# Patient Record
Sex: MALE | Race: WHITE | Employment: PART TIME | URBAN - METROPOLITAN AREA
[De-identification: names, ages, dates, MRNs, and addresses within clinical notes are randomized per-mention and may not be internally consistent; named-entity substitution may affect disease eponyms.]

---

## 2017-01-02 ENCOUNTER — TELEPHONE (OUTPATIENT)
Dept: OTHER | Facility: OUTPATIENT CENTER | Age: 52
End: 2017-01-02

## 2017-01-02 NOTE — TELEPHONE ENCOUNTER
[facility] Telephone Intake    Date:  2017  Client Name:  John Barclay  Preferred Name: Arnold      MRN:  [id number]   :  1965       Age:  51 year old     Presenting Problem / Reason for Assessment   (Clinical History &Symptoms):       About 1 year about pt was diagnosed with HIV, was using meth (and no one knew, they were keeping this secret until their HIV diagnosis, when they said they fell apart)  Pt went to treatment. During treatment they completed a sexual assessment and it was documented that they had compulsive sexual behavior. Pt was in treatment for about 50 days, they left early though. Wants to address their sexual addiction. (When asked for details, pt had a hard time giving more info,asked for some examples) Said they thought their sexual behavior may have started to be compulsive in their twenties, and struggled with infidelity throughout marriage. Currently  but beginning process of divorce.     Suggested Program: [facility]    Length of time experiencing Symptoms: since , most recent issues about last year    Seen Other Providers (if so, where):  [facility] : Dr. Fredo Gamble;  HIV specialist : Dr. Paty Whitaker  Therapist:  n/a  Psychiatrist:  n/a    Is presenting concern primarily sexual or mental health:  sexual      Medications:    HIV meds    Prescribing Physician:  Dr. Whitaker  Diagnosis (if known):  HIV    Referral Source:  Through [place]      Follow Up:    Insurance Benefits to be evaluated.  Note will be entered when validated.    Patient wishes to be contacted regarding Insurance benefits: Yes    Please Verify Registration    Please send Welcome Packet and document date sent.

## 2017-01-05 NOTE — TELEPHONE ENCOUNTER
PER [facility] @ [facility] NO CO-PAY, 20% CO-INS, $5700 DEDUCT W/ AN OOPM $7150 (0MET) AUTH REQ FOR ALL SERVICES, DA CALLED IN, NO EXCLU, [place] 2NDRY. LVM TO [facility] @ [facility].

## 2017-01-26 ENCOUNTER — OFFICE VISIT (OUTPATIENT)
Dept: INFECTIOUS DISEASES | Facility: CLINIC | Age: 52
End: 2017-01-26
Attending: INTERNAL MEDICINE
Payer: COMMERCIAL

## 2017-01-26 VITALS
BODY MASS INDEX: 25.55 KG/M2 | TEMPERATURE: 98.1 F | HEIGHT: 69 IN | DIASTOLIC BLOOD PRESSURE: 101 MMHG | HEART RATE: 101 BPM | SYSTOLIC BLOOD PRESSURE: 153 MMHG | WEIGHT: 172.5 LBS

## 2017-01-26 DIAGNOSIS — Z86.19 H/O SYPHILIS: ICD-10-CM

## 2017-01-26 DIAGNOSIS — B20 HUMAN IMMUNODEFICIENCY VIRUS (HIV) DISEASE (H): Primary | ICD-10-CM

## 2017-01-26 DIAGNOSIS — F43.21 SITUATIONAL DEPRESSION: ICD-10-CM

## 2017-01-26 DIAGNOSIS — A54.5 GONORRHEA OF PHARYNX: ICD-10-CM

## 2017-01-26 DIAGNOSIS — Z00.00 PREVENTATIVE HEALTH CARE: ICD-10-CM

## 2017-01-26 DIAGNOSIS — F32.A DEPRESSION, UNSPECIFIED DEPRESSION TYPE: ICD-10-CM

## 2017-01-26 DIAGNOSIS — B20 HUMAN IMMUNODEFICIENCY VIRUS (HIV) DISEASE (H): ICD-10-CM

## 2017-01-26 LAB
ALBUMIN SERPL-MCNC: 4.5 G/DL (ref 3.4–5)
ALP SERPL-CCNC: 54 U/L (ref 40–150)
ALT SERPL W P-5'-P-CCNC: 26 U/L (ref 0–70)
ANION GAP SERPL CALCULATED.3IONS-SCNC: 8 MMOL/L (ref 3–14)
AST SERPL W P-5'-P-CCNC: 22 U/L (ref 0–45)
BILIRUB SERPL-MCNC: 0.6 MG/DL (ref 0.2–1.3)
BUN SERPL-MCNC: 14 MG/DL (ref 7–30)
CALCIUM SERPL-MCNC: 8.9 MG/DL (ref 8.5–10.1)
CD3 CELLS # BLD: 2271 CELLS/UL (ref 603–2990)
CD3 CELLS NFR BLD: 74 % (ref 49–84)
CD3+CD4+ CELLS # BLD: 717 CELLS/UL (ref 441–2156)
CD3+CD4+ CELLS NFR BLD: 23 % (ref 28–63)
CD3+CD4+ CELLS/CD3+CD8+ CLL BLD: 0.48 % (ref 1.4–2.6)
CD3+CD8+ CELLS # BLD: 1480 CELLS/UL (ref 125–1312)
CD3+CD8+ CELLS NFR BLD: 48 % (ref 10–40)
CHLORIDE SERPL-SCNC: 104 MMOL/L (ref 94–109)
CO2 SERPL-SCNC: 27 MMOL/L (ref 20–32)
CREAT SERPL-MCNC: 0.98 MG/DL (ref 0.66–1.25)
ERYTHROCYTE [DISTWIDTH] IN BLOOD BY AUTOMATED COUNT: 12.3 % (ref 10–15)
GFR SERPL CREATININE-BSD FRML MDRD: 81 ML/MIN/1.7M2
GLUCOSE SERPL-MCNC: 93 MG/DL (ref 70–99)
HCT VFR BLD AUTO: 46.4 % (ref 40–53)
HGB BLD-MCNC: 16.5 G/DL (ref 13.3–17.7)
IFC SPECIMEN: ABNORMAL
IMMUNODEFICIENCY MARKERS SPEC-IMP: ABNORMAL
MCH RBC QN AUTO: 33.3 PG (ref 26.5–33)
MCHC RBC AUTO-ENTMCNC: 35.6 G/DL (ref 31.5–36.5)
MCV RBC AUTO: 94 FL (ref 78–100)
PLATELET # BLD AUTO: 282 10E9/L (ref 150–450)
POTASSIUM SERPL-SCNC: 3.8 MMOL/L (ref 3.4–5.3)
PROT SERPL-MCNC: 8.2 G/DL (ref 6.8–8.8)
RBC # BLD AUTO: 4.96 10E12/L (ref 4.4–5.9)
RPR SER-TITR: 1 {TITER}
SODIUM SERPL-SCNC: 140 MMOL/L (ref 133–144)
WBC # BLD AUTO: 7.3 10E9/L (ref 4–11)

## 2017-01-26 PROCEDURE — 36415 COLL VENOUS BLD VENIPUNCTURE: CPT | Performed by: INTERNAL MEDICINE

## 2017-01-26 PROCEDURE — 87491 CHLMYD TRACH DNA AMP PROBE: CPT | Performed by: INTERNAL MEDICINE

## 2017-01-26 PROCEDURE — 90471 IMMUNIZATION ADMIN: CPT | Mod: ZF

## 2017-01-26 PROCEDURE — 87900 PHENOTYPE INFECT AGENT DRUG: CPT | Mod: 59 | Performed by: INTERNAL MEDICINE

## 2017-01-26 PROCEDURE — 87536 HIV-1 QUANT&REVRSE TRNSCRPJ: CPT | Performed by: INTERNAL MEDICINE

## 2017-01-26 PROCEDURE — 80053 COMPREHEN METABOLIC PANEL: CPT | Performed by: INTERNAL MEDICINE

## 2017-01-26 PROCEDURE — 90734 MENACWYD/MENACWYCRM VACC IM: CPT | Mod: ZF

## 2017-01-26 PROCEDURE — 87591 N.GONORRHOEAE DNA AMP PROB: CPT | Performed by: INTERNAL MEDICINE

## 2017-01-26 PROCEDURE — 25000128 H RX IP 250 OP 636: Mod: ZF

## 2017-01-26 PROCEDURE — 87904 PHENOTYPE DNA HIV W/CLT ADD: CPT | Performed by: INTERNAL MEDICINE

## 2017-01-26 PROCEDURE — 86593 SYPHILIS TEST NON-TREP QUANT: CPT | Performed by: INTERNAL MEDICINE

## 2017-01-26 PROCEDURE — 90472 IMMUNIZATION ADMIN EACH ADD: CPT | Mod: ZF

## 2017-01-26 PROCEDURE — 99213 OFFICE O/P EST LOW 20 MIN: CPT | Mod: 25,ZF

## 2017-01-26 PROCEDURE — 87901 NFCT AGT GNTYP ALYS HIV1 REV: CPT | Performed by: INTERNAL MEDICINE

## 2017-01-26 PROCEDURE — 25000125 ZZHC RX 250: Mod: ZF

## 2017-01-26 PROCEDURE — 90636 HEP A/HEP B VACC ADULT IM: CPT | Mod: ZF

## 2017-01-26 PROCEDURE — 86359 T CELLS TOTAL COUNT: CPT | Performed by: INTERNAL MEDICINE

## 2017-01-26 PROCEDURE — 86360 T CELL ABSOLUTE COUNT/RATIO: CPT | Performed by: INTERNAL MEDICINE

## 2017-01-26 PROCEDURE — 87903 PHENOTYPE DNA HIV W/CULTURE: CPT | Performed by: INTERNAL MEDICINE

## 2017-01-26 PROCEDURE — 85027 COMPLETE CBC AUTOMATED: CPT | Performed by: INTERNAL MEDICINE

## 2017-01-26 PROCEDURE — 87906 NFCT AGT GNTYP ALYS HIV1: CPT | Performed by: INTERNAL MEDICINE

## 2017-01-26 RX ORDER — CITALOPRAM HYDROBROMIDE 20 MG/1
20 TABLET ORAL DAILY
Qty: 30 TABLET | Refills: 11 | Status: SHIPPED | OUTPATIENT
Start: 2017-01-26 | End: 2017-08-27

## 2017-01-26 ASSESSMENT — PAIN SCALES - GENERAL: PAINLEVEL: NO PAIN (0)

## 2017-01-26 NOTE — NURSING NOTE
"Chief Complaint   Patient presents with     RECHECK     follow up with arlyn WHITAKER cma       Initial /101 mmHg  Pulse 101  Temp(Src) 98.1  F (36.7  C) (Oral)  Ht 1.753 m (5' 9\")  Wt 78.245 kg (172 lb 8 oz)  BMI 25.46 kg/m2 Estimated body mass index is 25.46 kg/(m^2) as calculated from the following:    Height as of this encounter: 1.753 m (5' 9\").    Weight as of this encounter: 78.245 kg (172 lb 8 oz).  BP completed using cuff size: regular    "

## 2017-01-26 NOTE — MR AVS SNAPSHOT
After Visit Summary   1/26/2017    John Barclay    MRN: [id number]           Patient Information     Date Of Birth          1965        Visit Information        Provider Department      1/26/2017 8:30 AM Paty Whitaker MD [facility] and Infectious Diseases        Today's Diagnoses     Human immunodeficiency virus (HIV) disease (H)    -  1     Preventative health care         H/O syphilis         Depression, unspecified depression type            Follow-ups after your visit        Your next 10 appointments already scheduled     Feb 22, 2017  9:30 AM   Nurse Visit with  Dsc Nurse   [facility] and Infectious Diseases ([facility])    [address]  3rd Floor  [facility] [address] [phone]              Future tests that were ordered for you today     Open Future Orders        Priority Expected Expires Ordered    PhenoSense GT Plus Integrase Routine  1/26/2018 1/26/2017    HIV-1 RNA quantitative Routine  1/26/2018 1/26/2017    CBC with platelets Routine  1/26/2018 1/26/2017    T cell subset profile Routine  1/26/2018 1/26/2017    Comprehensive metabolic panel Routine  1/26/2018 1/26/2017    RPR with titer to monitor Routine  1/26/2018 1/26/2017    Gonorrhea PCR (set at Urine) ([id number]) Routine  1/26/2018 1/26/2017    Chlamydia PCR (set at Urine) ([id number]) Routine  1/26/2018 1/26/2017            Who to contact     If you have questions or need follow up information about today's clinic visit or your schedule please contact [facility] INFECTI[facility] DISE[facility] directly at [phone].  Normal or non-critical lab and imaging results will be communicated to you by MyChart, letter or phone within 4 business days after the clinic has received the results. If you do not hear from us within 7 days, please contact the clinic through MyChart or phone. If you have a critical or abnormal lab result, we will notify  "you by phone as soon as possible.  Submit refill requests through [employer] or call your pharmacy and they will forward the refill request to us. Please allow 3 business days for your refill to be completed.          Additional Information About Your Visit        [employer] Information     [employer] gives you secure access to your electronic health record. If you see a primary care provider, you can also send messages to your care team and make appointments. If you have questions, please call your primary care clinic.  If you do not have a primary care provider, please call [phone] and they will assist you.        Care EveryWhere ID     This is your Care EveryWhere ID. This could be used by other organizations to access your [place] medical records  [id number]        Your Vitals Were     Pulse Temperature Height BMI (Body Mass Index)          101 98.1  F (36.7  C) (Oral) 1.753 m (5' 9\") 25.46 kg/m2         Blood Pressure from Last 3 Encounters:   01/26/17 153/101   08/18/16 125/85   07/08/16 138/77    Weight from Last 3 Encounters:   01/26/17 78.245 kg (172 lb 8 oz)   08/18/16 73.165 kg (161 lb 4.8 oz)   07/08/16 79.379 kg (175 lb)              We Performed the Following     Chlamydia PCR (set at Rectal) ([id number])     Chlamydia PCR (set at Throat) ([id number])     Gonorrhea PCR (set at Rectal) ([id number])     Gonorrhea PCR (set at Throat) ([id number])     HEPA/HEPB VACCINE ADULT IM     MENINGOCOCCAL VACCINE,IM (MENACTRA)          Today's Medication Changes          These changes are accurate as of: 1/26/17  9:45 AM.  If you have any questions, ask your nurse or doctor.               Start taking these medicines.        Dose/Directions    citalopram 20 MG tablet   Commonly known as:  celeXA   Used for:  Depression, unspecified depression type   Started by:  Paty Whitaker MD        Dose:  20 mg   Take 1 tablet (20 mg) by mouth daily   Quantity:  30 tablet   Refills:  11            Where to get your medicines "      These medications were sent to [place], [place] - [address]  [address], [facility] [address]    Hours:  TRANSPLANT PHONE NUMBER [phone] Phone:  [phone]    - citalopram 20 MG tablet             Primary Care Provider    None Specified       No primary provider on file.        Thank you!     Thank you for choosing [facility] INFECTI[facility] DISEAS[facility]  for your care. Our goal is always to provide you with excellent care. Hearing back from our patients is one way we can continue to improve our services. Please take a few minutes to complete the written survey that you may receive in the mail after your visit with us. Thank you!             Your Updated Medication List - Protect others around you: Learn how to safely use, store and throw away your medicines at www.disposemymeds.org.          This list is accurate as of: 1/26/17  9:45 AM.  Always use your most recent med list.                   Brand Name Dispense Instructions for use    abacavir-dolutegravir-LamiVUDine 600- MG per tablet    TRIUMEQ    30 tablet    Take 1 tablet by mouth daily       citalopram 20 MG tablet    celeXA    30 tablet    Take 1 tablet (20 mg) by mouth daily

## 2017-01-26 NOTE — Clinical Note
Date:February 1, 2017      Patient was self referred, no letter generated. Do not send.        [facility] Information

## 2017-01-26 NOTE — PROGRESS NOTES
[facility] - HIV Progress Note  Dr. Paty Whitaker, [facility], [facility], [address] [place] [address], Sixth Floor, [address]  [place] [address]  Patient:  John Barclay, Date of birth 1965, Medical record number [id number]  Date of Visit:  01/26/2017         Assessment and Recommendations:     Human immunodeficiency virus (HIV) disease (H)  Continue Triumeq.  I counseled Mr. Barclay extensively today on this.  I am going to try to get his meds dispensed in pill boxes because he cites forgetting if he has taken his pills as one major issue.  He has also not integrated taking a pill into any routine part of his day.  He will attempt to do this as well.  I will check safety and surrogate markers today and I will add on a genotype.     Preventative health care  Cardiovascular Frankie -               Lipids - Total Chol 122 2/17/2016, LDL 72 2/17/2016              Blood Pressure - Elevated today. However, he has had several normal values off therapy this year. We will continue to monitor  Cancer Screening              Colon - Due for routine colonoscopy. Willing to do this year                Anal - Has expressed a preference to do this with his colonoscopy.  Immunizations              Hepatitis A - Second in series today              Hepatitis B - Second in series today              Influenza - Will give today              Pneumovax/Prevnar - PCV13 -2/16, PSV23 - 5/19/2016              Tdap - Up to date    Bone Health - Dexa screen normal 2016  Renal Health - Creatinine normal, UA with no proteinuria in 5/2016    Sexually Transmitted Infection Risk and Screening              Gonorrhea and Chlamydia - Will do today - rectal, throat and urine.      Situational depression  Mr. Barclay has followed with a psychologist who he really trusted.  This person had recommended that he consider some pharmacologic therapy.   Again today, he endorses symptoms of depression.  He denies active suicidal ideation although he does describe feeling of wishing that he had never lived.  We decided together that a trial of an SSRI might be valuable to helping him work through all of the emotions brought about by his life upheaval this year.  I will start him on low dose citalopram.  I discussed with him extensively the risk of suicide in the early period after starting an SSRI and made it clear that he should contact us in the event that he is having any of these feelings.  I also encouraged him to resume therapy with his psychologist.     Gonorrhea of pharynx  Mr. Barclay's throat NAAT returned positive for gonorrhea.  Will treat with IM Ceftriaxone and oral Azithromycin.       I spent more than 40 minutes today in face to face time managing the care of John Barclay.  Over 50% of my time on the unit was spent counseling the patient and/or coordinating care regarding services listed in this note.    Paty Whitaker MD  Division of Infectious Diseases and International Medicine  [phone]         History of Infectious Disease Illness:   Mr. Barclay is a 51 year old man who I follow for management of HIV.  He comes today for routine follow up.  He reports that he has had some issues with adherence to his medications and thinks that he is missing several doses a month - based on our conversation it might be more.  He said that he is not sure.  He is committed to taking them and he does not have any significant side effect.  He cites primarily that he has not integrated it into his routine.  He is willing to try taking them when he brushes his teeth every day and try pill boxes. We again discussed the mechanism of resistance development in HIV infection. He denies fever or chills.  No nausea, vomiting or diarrhea.  No rashes.  He denies any genital lesions, throat pain or rectal issues, but he is interested in STD testing because he is  sexually active. We discussed his mental health at some length today.  He reports that he is regularly feeling depressed mood and that this has significantly impacted his motivation. He also reports that he has been crying quite a bit, particularly at things that previously made him happy.  He had previously been seeing a therapist/psychologist who is very experienced in treating patients in his situation - men struggling with shame and addiction associated with their sexual orientation.  He stopped going several months back because he was not sure that it was helping.  However, he did think that it was a good therapeutic relationship. He reports that his psychologist has diagnosed him with situational depression and recommended that he contact me about pharmacotherapy.  He never did because he did not like the idea of being on medications. Today he says that he is interested in considering it.  He denies active suicidal ideation although he endorses sometimes feeling like he wish he had never been alive. He does not have any plan to hurt himself.         HIV History:   Date of Diagnosis: 2/2016  Approximated time of transmission: unknown  CD4 Montez: 225  Viral Load at Diagnosis: 194224  Opportunistic Infections: none  CMV Status: 2/25/16 positive  Toxo Status: 2/25/16 negative  HLA  Status: Negative  Tuberculosis Screening: negative  Historical use of ARVs: Truvada, Dolutegravir  Historical Resistance Mutations: genotype pending.         Past Medical and Surgical History:     Past Medical History   Diagnosis Date     Allergic rhinitis due to other allergen      mostly fall - hay fever        Past Surgical History   Procedure Laterality Date     No history of surgery             Family History:     Family History   Problem Relation Age of Onset     Arthritis Mother      Cancer - colorectal Father      Dx 1999, has colostomy, no return of cancer, 69 yoa     DIABETES Father      Family History Negative Brother   "    Family History Negative Brother      Family History Negative Brother      Family History Negative Brother      Family History Negative Brother      Family History Negative Brother      Family History Negative Brother      Family History Negative Sister      Family History Negative Sister      Breast Cancer Sister       at age 45 of breast cancer.           Social History:     Social History   Substance Use Topics     Smoking status: Current Every Day Smoker -- 0.25 packs/day for 4 years     Types: Cigarettes     Last Attempt to Quit: 1994     Smokeless tobacco: Never Used      Comment: 4 years x 1/2 ppd     Alcohol Use: 0.0 oz/week     0 Standard drinks or equivalent per week      Comment: very occasionaly, rarely      Social History     Social History Narrative    , 3 children    Employed as a residential             Review of Systems:   CONSTITUTIONAL:  No fevers or chills  RESPIRATORY:  negative for cough, sputum or dyspnea  GASTROINTESTINAL:  negative for nausea, vomiting, diarrhea or constipation  : No genital lesions, no dysuria  INTEGUMENT:  No rash or pruritus  NEURO:  Negative for headache         Current Medications:     Current Outpatient Prescriptions   Medication Sig Dispense Refill     abacavir-dolutegravir-LamiVUDine (TRIUMEQ) 600- MG per tablet Take 1 tablet by mouth daily 30 tablet 11            Immunization History:     Immunization History   Administered Date(s) Administered     Influenza Vaccine IM 3yrs+ 4 Valent IIV4 2016     Pneumococcal (PCV 13) 2016     Pneumococcal 23 valent 2016     TDAP (BOOSTRIX AGES 10-64) 2016     Twinrix A/B 2016            Allergies:     Allergies   Allergen Reactions     No Known Drug Allergies             Physical Exam:   Vital signs:  /101 mmHg  Pulse 101  Temp(Src) 98.1  F (36.7  C) (Oral)  Ht 1.753 m (5' 9\")  Wt 78.245 kg (172 lb 8 oz)  BMI 25.46 kg/m2    Physical Examination:  GENERAL:  " well-developed, well-nourished, seated in no acute distress.  HEENT:  Head is normocephalic, atraumatic   OP: Clear oropharynx, no lesions, no thrush  EYES:  Eyes have anicteric sclerae without conjunctival injection   SKIN:  No rashes  NEUROLOGIC:  Grossly nonfocal. Active x4 extremities  PSYCH: Oriented x3, + depressed mood.         Laboratory Data:     CD4 Count  ABSOLUTE CD4   Date Value Ref Range Status   08/18/2016 445 441 - 2156 cells/uL Final     CD4 HELPER T   Date Value Ref Range Status   08/18/2016 26* 28 - 63 % Final     CD4:CD8 RATIO   Date Value Ref Range Status   08/18/2016 0.54* 1.40 - 2.60 Final       HIV-1 RNA Quantitative:  HIV-1 RNA QUANT RESULT   Date Value Ref Range Status   08/18/2016 74* HIVND [Copies]/mL Final     Comment:     The RADHA AmpliPrep/RADHA TaqMan HIV-1 test is an FDA-approved in vitro   nucleic   acid amplification test for the quantitation of HIV-1 RNA in human plasma   (EDTA   plasma) using the [employer]HA AmpliPrep instrument for automated viral nucleic acid   extraction and the RADHA TaqMan Analyzer or [employer] TaqMan for automated Real   Time PCR amplification and detection of the viral nucleic acid target.   Titer results are reported in copies/ml. This assay is intended for use in   conjunction with clinical presentation and other laboratory markers of   disease   prognosis and for use as an aid in assessing viral response to antiretroviral   treatment as measured by changes in plasma HIV-1 RNA levels. This test should   not be used as a donor screening test to confirm the presence of HIV-1   infection.         Metabolic Studies       SODIUM   Date Value Ref Range Status   08/18/2016 140 133 - 144 mmol/L Final   05/19/2016 140 133 - 144 mmol/L Final     POTASSIUM   Date Value Ref Range Status   08/18/2016 3.6 3.4 - 5.3 mmol/L Final   05/19/2016 3.7 3.4 - 5.3 mmol/L Final     CHLORIDE   Date Value Ref Range Status   08/18/2016 104 94 - 109 mmol/L Final   05/19/2016 107 94 - 109  mmol/L Final     CARBON DIOXIDE   Date Value Ref Range Status   08/18/2016 29 20 - 32 mmol/L Final   05/19/2016 27 20 - 32 mmol/L Final     ANION GAP   Date Value Ref Range Status   08/18/2016 8 3 - 14 mmol/L Final   05/19/2016 5 3 - 14 mmol/L Final     UREA NITROGEN   Date Value Ref Range Status   08/18/2016 12 7 - 30 mg/dL Final   05/19/2016 13 7 - 30 mg/dL Final     CREATININE   Date Value Ref Range Status   08/18/2016 0.97 0.66 - 1.25 mg/dL Final   05/19/2016 0.86 0.66 - 1.25 mg/dL Final     GFR ESTIMATE   Date Value Ref Range Status   08/18/2016 81 >60 mL/min/1.7m2 Final     Comment:     Non  GFR Calc   05/19/2016 >90  Non  GFR Calc   >60 mL/min/1.7m2 Final     GLUCOSE   Date Value Ref Range Status   08/18/2016 79 70 - 99 mg/dL Final   05/19/2016 81 70 - 99 mg/dL Final     HEMOGLOBIN A1C   Date Value Ref Range Status   02/16/2016 5.0 4.3 - 6.0 % Final     CALCIUM   Date Value Ref Range Status   08/18/2016 9.0 8.5 - 10.1 mg/dL Final   05/19/2016 8.8 8.5 - 10.1 mg/dL Final       Hepatic Studies    BILIRUBIN TOTAL   Date Value Ref Range Status   08/18/2016 0.7 0.2 - 1.3 mg/dL Final   05/19/2016 0.4 0.2 - 1.3 mg/dL Final     ALKALINE PHOSPHATASE   Date Value Ref Range Status   08/18/2016 79 40 - 150 U/L Final   05/19/2016 66 40 - 150 U/L Final     ALBUMIN   Date Value Ref Range Status   08/18/2016 4.1 3.4 - 5.0 g/dL Final   05/19/2016 3.7 3.4 - 5.0 g/dL Final     AST   Date Value Ref Range Status   08/18/2016 24 0 - 45 U/L Final   05/19/2016 23 0 - 45 U/L Final     ALT   Date Value Ref Range Status   08/18/2016 25 0 - 70 U/L Final   05/19/2016 24 0 - 70 U/L Final       Hematology Studies      WBC   Date Value Ref Range Status   08/18/2016 5.2 4.0 - 11.0 10e9/L Final   05/19/2016 5.0 4.0 - 11.0 10e9/L Final     ABSOLUTE NEUTROPHIL   Date Value Ref Range Status   02/14/2016 3.2 1.6 - 8.3 10e9/L Final     ABSOLUTE LYMPHOCYTES   Date Value Ref Range Status   02/14/2016 1.1 0.8 - 5.3  10e9/L Final     ABSOLUTE MONOCYTES   Date Value Ref Range Status   02/14/2016 0.2 0.0 - 1.3 10e9/L Final     ABSOLUTE EOSINOPHILS   Date Value Ref Range Status   02/14/2016 0.1 0.0 - 0.7 10e9/L Final     HEMOGLOBIN   Date Value Ref Range Status   08/18/2016 15.7 13.3 - 17.7 g/dL Final   05/19/2016 14.8 13.3 - 17.7 g/dL Final     HEMATOCRIT   Date Value Ref Range Status   08/18/2016 44.9 40.0 - 53.0 % Final   05/19/2016 42.7 40.0 - 53.0 % Final     PLATELET COUNT   Date Value Ref Range Status   08/18/2016 245 150 - 450 10e9/L Final   05/19/2016 235 150 - 450 10e9/L Final       Hepatitis B Testing     Recent Labs   Lab Test  02/16/16   0833   HEPBANG  Nonreactive       Hepatitis C Testing     HEPATITIS C ANTIBODY   Date Value Ref Range Status   02/14/2016  NR Final    Nonreactive   Assay performance characteristics have not been established for newborns,   infants, and children       Imaging:  No results found for this or any previous visit (from the past 744 hour(s)).

## 2017-01-26 NOTE — Clinical Note
1/26/2017       RE: John Barclay  [address]  [facility] [address]     Dear Colleague,    Thank you for referring your patient, John Barclay, to the [facility] AND INFECTIOUS DISEASES at [facility]. Please see a copy of my visit note below.    General acute hospital - HIV Progress Note  Dr. Paty Whitaker, [facility], [facility], [address] [place] [address], Sixth Floor, [address]  [place] [address]  Patient:  John Barclay, Date of birth 1965, Medical record number [id number]  Date of Visit:  01/26/2017         Assessment and Recommendations:     Human immunodeficiency virus (HIV) disease (H)  Continue Triumeq.  I counseled Mr. Barclay extensively today on this.  I am going to try to get his meds dispensed in pill boxes because he cites forgetting if he has taken his pills as one major issue.  He has also not integrated taking a pill into any routine part of his day.  He will attempt to do this as well.  I will check safety and surrogate markers today and I will add on a genotype.     Preventative health care  Cardiovascular Frankie -               Lipids - Total Chol 122 2/17/2016, LDL 72 2/17/2016              Blood Pressure - Elevated today. However, he has had several normal values off therapy this year. We will continue to monitor  Cancer Screening              Colon - Due for routine colonoscopy. Willing to do this year                Anal - Has expressed a preference to do this with his colonoscopy.  Immunizations              Hepatitis A - Second in series today              Hepatitis B - Second in series today              Influenza - Will give today              Pneumovax/Prevnar - PCV13 -2/16, PSV23 - 5/19/2016              Tdap - Up to date    Bone Health - Dexa screen normal 2016  Renal Health - Creatinine normal, UA with no proteinuria in 5/2016     Sexually Transmitted Infection Risk and Screening              Gonorrhea and Chlamydia - Will do today - rectal, throat and urine.      Situational depression  Mr. Barclay has followed with a psychologist who he really trusted.  This person had recommended that he consider some pharmacologic therapy.  Again today, he endorses symptoms of depression.  He denies active suicidal ideation although he does describe feeling of wishing that he had never lived.  We decided together that a trial of an SSRI might be valuable to helping him work through all of the emotions brought about by his life upheaval this year.  I will start him on low dose citalopram.  I discussed with him extensively the risk of suicide in the early period after starting an SSRI and made it clear that he should contact us in the event that he is having any of these feelings.  I also encouraged him to resume therapy with his psychologist.     Gonorrhea of pharynx  Mr. Barclay's throat NAAT returned positive for gonorrhea.  Will treat with IM Ceftriaxone and oral Azithromycin.       I spent more than 40 minutes today in face to face time managing the care of John Barclay.  Over 50% of my time on the unit was spent counseling the patient and/or coordinating care regarding services listed in this note.    Paty Whitaker MD  Division of Infectious Diseases and International Medicine  [phone]         History of Infectious Disease Illness:   Mr. Barclay is a 51 year old man who I follow for management of HIV.  He comes today for routine follow up.  He reports that he has had some issues with adherence to his medications and thinks that he is missing several doses a month - based on our conversation it might be more.  He said that he is not sure.  He is committed to taking them and he does not have any significant side effect.  He cites primarily that he has not integrated it into his routine.  He is willing to try taking them when he brushes his  teeth every day and try pill boxes. We again discussed the mechanism of resistance development in HIV infection. He denies fever or chills.  No nausea, vomiting or diarrhea.  No rashes.  He denies any genital lesions, throat pain or rectal issues, but he is interested in STD testing because he is sexually active. We discussed his mental health at some length today.  He reports that he is regularly feeling depressed mood and that this has significantly impacted his motivation. He also reports that he has been crying quite a bit, particularly at things that previously made him happy.  He had previously been seeing a therapist/psychologist who is very experienced in treating patients in his situation - men struggling with shame and addiction associated with their sexual orientation.  He stopped going several months back because he was not sure that it was helping.  However, he did think that it was a good therapeutic relationship. He reports that his psychologist has diagnosed him with situational depression and recommended that he contact me about pharmacotherapy.  He never did because he did not like the idea of being on medications. Today he says that he is interested in considering it.  He denies active suicidal ideation although he endorses sometimes feeling like he wish he had never been alive. He does not have any plan to hurt himself.         HIV History:   Date of Diagnosis: 2/2016  Approximated time of transmission: unknown  CD4 Montez: 225  Viral Load at Diagnosis: 455720  Opportunistic Infections: none  CMV Status: 2/25/16 positive  Toxo Status: 2/25/16 negative  HLA  Status: Negative  Tuberculosis Screening: negative  Historical use of ARVs: Truvada, Dolutegravir  Historical Resistance Mutations: genotype pending.         Past Medical and Surgical History:     Past Medical History   Diagnosis Date     Allergic rhinitis due to other allergen      mostly fall - hay fever        Past Surgical History    Procedure Laterality Date     No history of surgery             Family History:     Family History   Problem Relation Age of Onset     Arthritis Mother      Cancer - colorectal Father      Dx , has colostomy, no return of cancer, 69 yoa     DIABETES Father      Family History Negative Brother      Family History Negative Brother      Family History Negative Brother      Family History Negative Brother      Family History Negative Brother      Family History Negative Brother      Family History Negative Brother      Family History Negative Sister      Family History Negative Sister      Breast Cancer Sister       at age 45 of breast cancer.           Social History:     Social History   Substance Use Topics     Smoking status: Current Every Day Smoker -- 0.25 packs/day for 4 years     Types: Cigarettes     Last Attempt to Quit: 1994     Smokeless tobacco: Never Used      Comment: 4 years x 1/2 ppd     Alcohol Use: 0.0 oz/week     0 Standard drinks or equivalent per week      Comment: very occasionaly, rarely      Social History     Social History Narrative    , 3 children    Employed as a residential             Review of Systems:   CONSTITUTIONAL:  No fevers or chills  RESPIRATORY:  negative for cough, sputum or dyspnea  GASTROINTESTINAL:  negative for nausea, vomiting, diarrhea or constipation  : No genital lesions, no dysuria  INTEGUMENT:  No rash or pruritus  NEURO:  Negative for headache         Current Medications:     Current Outpatient Prescriptions   Medication Sig Dispense Refill     abacavir-dolutegravir-LamiVUDine (TRIUMEQ) 600- MG per tablet Take 1 tablet by mouth daily 30 tablet 11            Immunization History:     Immunization History   Administered Date(s) Administered     Influenza Vaccine IM 3yrs+ 4 Valent IIV4 2016     Pneumococcal (PCV 13) 2016     Pneumococcal 23 valent 2016     TDAP (BOOSTRIX AGES 10-64) 2016     Twinrix A/B  "08/18/2016            Allergies:     Allergies   Allergen Reactions     No Known Drug Allergies             Physical Exam:   Vital signs:  /101 mmHg  Pulse 101  Temp(Src) 98.1  F (36.7  C) (Oral)  Ht 1.753 m (5' 9\")  Wt 78.245 kg (172 lb 8 oz)  BMI 25.46 kg/m2    Physical Examination:  GENERAL:  well-developed, well-nourished, seated in no acute distress.  HEENT:  Head is normocephalic, atraumatic   OP: Clear oropharynx, no lesions, no thrush  EYES:  Eyes have anicteric sclerae without conjunctival injection   SKIN:  No rashes  NEUROLOGIC:  Grossly nonfocal. Active x4 extremities  PSYCH: Oriented x3, + depressed mood.         Laboratory Data:     CD4 Count  ABSOLUTE CD4   Date Value Ref Range Status   08/18/2016 445 441 - 2156 cells/uL Final     CD4 HELPER T   Date Value Ref Range Status   08/18/2016 26* 28 - 63 % Final     CD4:CD8 RATIO   Date Value Ref Range Status   08/18/2016 0.54* 1.40 - 2.60 Final       HIV-1 RNA Quantitative:  HIV-1 RNA QUANT RESULT   Date Value Ref Range Status   08/18/2016 74* HIVND [Copies]/mL Final     Comment:     The RADHA AmpliPrep/RADHA TaqMan HIV-1 test is an FDA-approved in vitro   nucleic   acid amplification test for the quantitation of HIV-1 RNA in human plasma   (EDTA   plasma) using the RADHA AmpliPrep instrument for automated viral nucleic acid   extraction and the RADHA TaqMan Analyzer or RADHA TaqMan for automated Real   Time PCR amplification and detection of the viral nucleic acid target.   Titer results are reported in copies/ml. This assay is intended for use in   conjunction with clinical presentation and other laboratory markers of   disease   prognosis and for use as an aid in assessing viral response to antiretroviral   treatment as measured by changes in plasma HIV-1 RNA levels. This test should   not be used as a donor screening test to confirm the presence of HIV-1   infection.         Metabolic Studies       SODIUM   Date Value Ref Range Status "   08/18/2016 140 133 - 144 mmol/L Final   05/19/2016 140 133 - 144 mmol/L Final     POTASSIUM   Date Value Ref Range Status   08/18/2016 3.6 3.4 - 5.3 mmol/L Final   05/19/2016 3.7 3.4 - 5.3 mmol/L Final     CHLORIDE   Date Value Ref Range Status   08/18/2016 104 94 - 109 mmol/L Final   05/19/2016 107 94 - 109 mmol/L Final     CARBON DIOXIDE   Date Value Ref Range Status   08/18/2016 29 20 - 32 mmol/L Final   05/19/2016 27 20 - 32 mmol/L Final     ANION GAP   Date Value Ref Range Status   08/18/2016 8 3 - 14 mmol/L Final   05/19/2016 5 3 - 14 mmol/L Final     UREA NITROGEN   Date Value Ref Range Status   08/18/2016 12 7 - 30 mg/dL Final   05/19/2016 13 7 - 30 mg/dL Final     CREATININE   Date Value Ref Range Status   08/18/2016 0.97 0.66 - 1.25 mg/dL Final   05/19/2016 0.86 0.66 - 1.25 mg/dL Final     GFR ESTIMATE   Date Value Ref Range Status   08/18/2016 81 >60 mL/min/1.7m2 Final     Comment:     Non  GFR Calc   05/19/2016 >90  Non  GFR Calc   >60 mL/min/1.7m2 Final     GLUCOSE   Date Value Ref Range Status   08/18/2016 79 70 - 99 mg/dL Final   05/19/2016 81 70 - 99 mg/dL Final     HEMOGLOBIN A1C   Date Value Ref Range Status   02/16/2016 5.0 4.3 - 6.0 % Final     CALCIUM   Date Value Ref Range Status   08/18/2016 9.0 8.5 - 10.1 mg/dL Final   05/19/2016 8.8 8.5 - 10.1 mg/dL Final       Hepatic Studies    BILIRUBIN TOTAL   Date Value Ref Range Status   08/18/2016 0.7 0.2 - 1.3 mg/dL Final   05/19/2016 0.4 0.2 - 1.3 mg/dL Final     ALKALINE PHOSPHATASE   Date Value Ref Range Status   08/18/2016 79 40 - 150 U/L Final   05/19/2016 66 40 - 150 U/L Final     ALBUMIN   Date Value Ref Range Status   08/18/2016 4.1 3.4 - 5.0 g/dL Final   05/19/2016 3.7 3.4 - 5.0 g/dL Final     AST   Date Value Ref Range Status   08/18/2016 24 0 - 45 U/L Final   05/19/2016 23 0 - 45 U/L Final     ALT   Date Value Ref Range Status   08/18/2016 25 0 - 70 U/L Final   05/19/2016 24 0 - 70 U/L Final        Hematology Studies      WBC   Date Value Ref Range Status   08/18/2016 5.2 4.0 - 11.0 10e9/L Final   05/19/2016 5.0 4.0 - 11.0 10e9/L Final     ABSOLUTE NEUTROPHIL   Date Value Ref Range Status   02/14/2016 3.2 1.6 - 8.3 10e9/L Final     ABSOLUTE LYMPHOCYTES   Date Value Ref Range Status   02/14/2016 1.1 0.8 - 5.3 10e9/L Final     ABSOLUTE MONOCYTES   Date Value Ref Range Status   02/14/2016 0.2 0.0 - 1.3 10e9/L Final     ABSOLUTE EOSINOPHILS   Date Value Ref Range Status   02/14/2016 0.1 0.0 - 0.7 10e9/L Final     HEMOGLOBIN   Date Value Ref Range Status   08/18/2016 15.7 13.3 - 17.7 g/dL Final   05/19/2016 14.8 13.3 - 17.7 g/dL Final     HEMATOCRIT   Date Value Ref Range Status   08/18/2016 44.9 40.0 - 53.0 % Final   05/19/2016 42.7 40.0 - 53.0 % Final     PLATELET COUNT   Date Value Ref Range Status   08/18/2016 245 150 - 450 10e9/L Final   05/19/2016 235 150 - 450 10e9/L Final       Hepatitis B Testing     Recent Labs   Lab Test  02/16/16   0833   HEPBANG  Nonreactive       Hepatitis C Testing     HEPATITIS C ANTIBODY   Date Value Ref Range Status   02/14/2016  NR Final    Nonreactive   Assay performance characteristics have not been established for newborns,   infants, and children       Imaging:  No results found for this or any previous visit (from the past 744 hour(s)).          Again, thank you for allowing me to participate in the care of your patient.      Sincerely,    Paty Whitaker MD

## 2017-01-27 LAB
C TRACH DNA SPEC QL NAA+PROBE: NORMAL
HIV1 RNA # PLAS NAA DL=20: ABNORMAL {COPIES}/ML
HIV1 RNA SERPL NAA+PROBE-LOG#: <1.3 {LOG_COPIES}/ML
N GONORRHOEA DNA SPEC QL NAA+PROBE: NORMAL
N GONORRHOEA DNA SPEC QL NAA+PROBE: NORMAL
SPECIMEN SOURCE: NORMAL

## 2017-01-29 LAB
N GONORRHOEA DNA SPEC QL NAA+PROBE: ABNORMAL
SPECIMEN SOURCE: ABNORMAL

## 2017-01-30 DIAGNOSIS — A54.9 GONORRHEA: Primary | ICD-10-CM

## 2017-01-30 PROBLEM — F43.21 SITUATIONAL DEPRESSION: Status: ACTIVE | Noted: 2017-01-30

## 2017-01-30 PROBLEM — A54.5: Status: ACTIVE | Noted: 2017-01-30

## 2017-01-30 RX ORDER — AZITHROMYCIN 500 MG/1
1000 TABLET, FILM COATED ORAL ONCE
Qty: 2 TABLET | Refills: 0 | Status: SHIPPED | OUTPATIENT
Start: 2017-01-30 | End: 2017-01-30

## 2017-01-30 NOTE — ASSESSMENT & PLAN NOTE
Continue Triumeq.  I counseled Mr. Barclay extensively today on this.  I am going to try to get his meds dispensed in pill boxes because he cites forgetting if he has taken his pills as one major issue.  He has also not integrated taking a pill into any routine part of his day.  He will attempt to do this as well.  I will check safety and surrogate markers today and I will add on a genotype.

## 2017-01-30 NOTE — ASSESSMENT & PLAN NOTE
Mr. Barclay's throat NAAT returned positive for gonorrhea.  Will treat with IM Ceftriaxone and oral Azithromycin.

## 2017-01-30 NOTE — ASSESSMENT & PLAN NOTE
Mr. Barclay has followed with a psychologist who he really trusted.  This person had recommended that he consider some pharmacologic therapy.  Again today, he endorses symptoms of depression.  He denies active suicidal ideation although he does describe feeling of wishing that he had never lived.  We decided together that a trial of an SSRI might be valuable to helping him work through all of the emotions brought about by his life upheaval this year.  I will start him on low dose citalopram.  I discussed with him extensively the risk of suicide in the early period after starting an SSRI and made it clear that he should contact us in the event that he is having any of these feelings.  I also encouraged him to resume therapy with his psychologist.

## 2017-01-30 NOTE — ASSESSMENT & PLAN NOTE
Cardiovascular Frankie -               Lipids - Total Chol 122 2/17/2016, LDL 72 2/17/2016              Blood Pressure - Elevated today. However, he has had several normal values off therapy this year. We will continue to monitor  Cancer Screening              Colon - Due for routine colonoscopy. Willing to do this year                Anal - Has expressed a preference to do this with his colonoscopy.  Immunizations              Hepatitis A - Second in series today              Hepatitis B - Second in series today              Influenza - Will give today              Pneumovax/Prevnar - PCV13 -2/16, PSV23 - 5/19/2016              Tdap - Up to date    Bone Health - Dexa screen normal 2016  Renal Health - Creatinine normal, UA with no proteinuria in 5/2016    Sexually Transmitted Infection Risk and Screening              Gonorrhea and Chlamydia - Will do today - rectal, throat and urine.

## 2017-02-13 ENCOUNTER — TELEPHONE (OUTPATIENT)
Dept: INFECTIOUS DISEASES | Facility: CLINIC | Age: 52
End: 2017-02-13

## 2017-02-13 NOTE — TELEPHONE ENCOUNTER
Called patient left message to return call to clinic. Brianda Hammer RN...02/13/17....4:12 PM  Infectious Disease Clinic  [facility]  [phone]

## 2017-02-15 ENCOUNTER — TELEPHONE (OUTPATIENT)
Dept: INFECTIOUS DISEASES | Facility: CLINIC | Age: 52
End: 2017-02-15

## 2017-02-15 NOTE — TELEPHONE ENCOUNTER
Call to patient, left message to call ID clinic. Patient needs to come in sooner then 2/22/2017 for treatment. Need to schedule a sooner appointment. Brianda Hammer RN...02/15/17....12:59 PM  Infectious Disease Clinic  [facility]  [phone]

## 2017-02-16 NOTE — TELEPHONE ENCOUNTER
Call to patient again to schedule appointment. Brianda Hammer RN...02/16/17....11:36 AM  Infectious Disease Clinic  [facility]  [phone]

## 2017-02-18 ENCOUNTER — TELEPHONE (OUTPATIENT)
Dept: INFECTIOUS DISEASES | Facility: CLINIC | Age: 52
End: 2017-02-18

## 2017-02-18 DIAGNOSIS — B20 HUMAN IMMUNODEFICIENCY VIRUS (HIV) DISEASE (H): Primary | ICD-10-CM

## 2017-02-18 NOTE — TELEPHONE ENCOUNTER
Patient calling ID on call as he is out of his triumeq x 1 day - did not get mailed to him this month. He would like script sent to [facility] in [place] - as this prescriber cannot e-prescribe called in 30 tabs of triumeq to [facility] and will alert primary HIV provider about med delivery issue.     Seda Carranza MD - N ID fellow  [phone]

## 2017-02-22 ENCOUNTER — ALLIED HEALTH/NURSE VISIT (OUTPATIENT)
Dept: INFECTIOUS DISEASES | Facility: CLINIC | Age: 52
End: 2017-02-22
Attending: INTERNAL MEDICINE
Payer: COMMERCIAL

## 2017-02-22 DIAGNOSIS — A54.9 GONORRHEA: Primary | ICD-10-CM

## 2017-02-22 PROCEDURE — 99211 OFF/OP EST MAY X REQ PHY/QHP: CPT | Mod: 25,ZF

## 2017-02-22 PROCEDURE — 96372 THER/PROPH/DIAG INJ SC/IM: CPT

## 2017-02-22 PROCEDURE — 25000128 H RX IP 250 OP 636: Mod: ZF

## 2017-02-22 PROCEDURE — 25000125 ZZHC RX 250: Mod: ZF

## 2017-02-22 NOTE — MR AVS SNAPSHOT
After Visit Summary   2/22/2017    John Barclay    MRN: [id number]           Patient Information     Date Of Birth          1965        Visit Information        Provider Department      2/22/2017 9:30 AM Nurse, Aye Jimenez [place] and Infectious Diseases        Today's Diagnoses     Gonorrhea    -  1       Follow-ups after your visit        Who to contact     If you have questions or need follow up information about today's clinic visit or your schedule please contact [facility] AND INFECTIOUS DISEASES directly at [phone].  Normal or non-critical lab and imaging results will be communicated to you by kapturemhart, letter or phone within 4 business days after the clinic has received the results. If you do not hear from us within 7 days, please contact the clinic through kapturemhart or phone. If you have a critical or abnormal lab result, we will notify you by phone as soon as possible.  Submit refill requests through [facility] or call your pharmacy and they will forward the refill request to us. Please allow 3 business days for your refill to be completed.          Additional Information About Your Visit        MyChart Information     [facility] gives you secure access to your electronic health record. If you see a primary care provider, you can also send messages to your care team and make appointments. If you have questions, please call your primary care clinic.  If you do not have a primary care provider, please call [phone] and they will assist you.        Care EveryWhere ID     This is your Care EveryWhere ID. This could be used by other organizations to access your [place] medical records  [id number]         Blood Pressure from Last 3 Encounters:   01/26/17 (!) 153/101   08/18/16 125/85   07/08/16 138/77    Weight from Last 3 Encounters:   01/26/17 78.2 kg (172 lb 8 oz)   08/18/16 73.2 kg (161 lb 4.8 oz)   07/08/16 79.4 kg (175 lb)              Today, you had the  following     No orders found for display       Primary Care Provider    None Specified       No primary provider on file.        Thank you!     Thank you for choosing [facility] AND INFECTIOUS DISEASES  for your care. Our goal is always to provide you with excellent care. Hearing back from our patients is one way we can continue to improve our services. Please take a few minutes to complete the written survey that you may receive in the mail after your visit with us. Thank you!             Your Updated Medication List - Protect others around you: Learn how to safely use, store and throw away your medicines at www.disposemymeds.org.          This list is accurate as of: 2/22/17 11:59 PM.  Always use your most recent med list.                   Brand Name Dispense Instructions for use    abacavir-dolutegravir-LamiVUDine 600- MG per tablet    TRIUMEQ    30 tablet    Take 1 tablet by mouth daily       citalopram 20 MG tablet    celeXA    30 tablet    Take 1 tablet (20 mg) by mouth daily

## 2017-02-22 NOTE — NURSING NOTE
The following medication was given:     MEDICATION: Rocephin 250mgmg and Lidocaine 1cc  ROUTE: IM  SITE: RUQ - Gluteus  DOSE: 250mg  LOT #: [id number]  :  Miquel  EXPIRATION DATE:  11/30/2018  NDC#: 8166-3141-49  PT TOLERATED INJECTION without any compilations, was identified by last name and date of birth

## 2017-02-28 LAB
PHENOSENSE GT EER HIV-1 INTEGRASE: NORMAL
SERVICE CMNT-IMP: <20
SPECIMEN DRAWN SERPL: NORMAL

## 2017-08-02 ENCOUNTER — OFFICE VISIT (OUTPATIENT)
Dept: FAMILY MEDICINE | Facility: CLINIC | Age: 52
End: 2017-08-02
Payer: COMMERCIAL

## 2017-08-02 VITALS
RESPIRATION RATE: 16 BRPM | TEMPERATURE: 97 F | DIASTOLIC BLOOD PRESSURE: 95 MMHG | HEART RATE: 109 BPM | WEIGHT: 173 LBS | SYSTOLIC BLOOD PRESSURE: 145 MMHG | HEIGHT: 69 IN | OXYGEN SATURATION: 97 % | BODY MASS INDEX: 25.62 KG/M2

## 2017-08-02 DIAGNOSIS — Z01.818 PREOP GENERAL PHYSICAL EXAM: Primary | ICD-10-CM

## 2017-08-02 DIAGNOSIS — S83.241A TEAR OF MEDIAL MENISCUS OF RIGHT KNEE, CURRENT, UNSPECIFIED TEAR TYPE, INITIAL ENCOUNTER: ICD-10-CM

## 2017-08-02 DIAGNOSIS — B20 HUMAN IMMUNODEFICIENCY VIRUS (HIV) DISEASE (H): ICD-10-CM

## 2017-08-02 PROCEDURE — 99214 OFFICE O/P EST MOD 30 MIN: CPT | Performed by: NURSE PRACTITIONER

## 2017-08-02 RX ORDER — HYDROCODONE BITARTRATE AND ACETAMINOPHEN 5; 325 MG/1; MG/1
TABLET ORAL
Refills: 0 | COMMUNITY
Start: 2017-07-29 | End: 2017-08-27

## 2017-08-02 RX ORDER — POLYMYXIN B SULFATE AND TRIMETHOPRIM 1; 10000 MG/ML; [USP'U]/ML
SOLUTION OPHTHALMIC
Refills: 0 | COMMUNITY
Start: 2017-07-29 | End: 2017-08-27

## 2017-08-02 NOTE — MR AVS SNAPSHOT
After Visit Summary   8/2/2017    John Barclay    MRN: [id number]           Patient Information     Date Of Birth          1965        Visit Information        Provider Department      8/2/2017 9:30 AM Pretty Lema APRN CNP [facility]        Today's Diagnoses     Preop general physical exam    -  1    Tear of medial meniscus of right knee, current, unspecified tear type, initial encounter        Human immunodeficiency virus (HIV) disease (H)          Care Instructions      Before Your Surgery      Call your surgeon if there is any change in your health. This includes signs of a cold or flu (such as a sore throat, runny nose, cough, rash or fever).    Do not smoke, drink alcohol or take over the counter medicine (unless your surgeon or primary care doctor tells you to) for the 24 hours before and after surgery.    If you take prescribed drugs: Follow your doctor s orders about which medicines to take and which to stop until after surgery.    Eating and drinking prior to surgery: follow the instructions from your surgeon    Take a shower or bath the night before surgery. Use the soap your surgeon gave you to gently clean your skin. If you do not have soap from your surgeon, use your regular soap. Do not shave or scrub the surgery site.  Wear clean pajamas and have clean sheets on your bed.           Follow-ups after your visit        Your next 10 appointments already scheduled     Aug 31, 2017  8:30 AM CDT   (Arrive by 8:15 AM)   Return Visit with Paty Whitaker MD   [facility] ([facility])    [address] [place] [address]  3[facility] [address] [phone]              Who to contact     If you have questions or need follow up information about today's clinic visit or your schedule please contact [facility] directly at [phone].  Normal or non-critical lab and imaging  "results will be communicated to you by MyChart, letter or phone within 4 business days after the clinic has received the results. If you do not hear from us within 7 days, please contact the clinic through 7fgame or phone. If you have a critical or abnormal lab result, we will notify you by phone as soon as possible.  Submit refill requests through 7fgame or call your pharmacy and they will forward the refill request to us. Please allow 3 business days for your refill to be completed.          Additional Information About Your Visit        7fgame Information     7fgame gives you secure access to your electronic health record. If you see a primary care provider, you can also send messages to your care team and make appointments. If you have questions, please call your primary care clinic.  If you do not have a primary care provider, please call [phone] and they will assist you.        Care EveryWhere ID     This is your Care EveryWhere ID. This could be used by other organizations to access your [facility] medical records  [id number]        Your Vitals Were     Pulse Temperature Respirations Height Pulse Oximetry BMI (Body Mass Index)    109 97  F (36.1  C) (Oral) 16 5' 9\" (1.753 m) 97% 25.55 kg/m2       Blood Pressure from Last 3 Encounters:   08/02/17 (!) 145/95   01/26/17 (!) 153/101   08/18/16 125/85    Weight from Last 3 Encounters:   08/02/17 173 lb (78.5 kg)   01/26/17 172 lb 8 oz (78.2 kg)   08/18/16 161 lb 4.8 oz (73.2 kg)              Today, you had the following     No orders found for display       Primary Care Provider    None Specified       No primary provider on file.        Equal Access to Services     [facility] : Hadii davey Brito, trena smith, qaybsage robertsalangel san. So [facility] [phone].    ATENCIÓN: Si habla español, tiene a carpio disposición servicios gratuitos de asistencia lingüística. Llame al [phone].    We comply " with applicable federal civil rights laws and [place] laws. We do not discriminate on the basis of race, color, national origin, age, disability sex, sexual orientation or gender identity.            Thank you!     Thank you for choosing [facility]  for your care. Our goal is always to provide you with excellent care. Hearing back from our patients is one way we can continue to improve our services. Please take a few minutes to complete the written survey that you may receive in the mail after your visit with us. Thank you!             Your Updated Medication List - Protect others around you: Learn how to safely use, store and throw away your medicines at www.disposemymeds.org.          This list is accurate as of: 8/2/17  9:56 AM.  Always use your most recent med list.                   Brand Name Dispense Instructions for use Diagnosis    abacavir-dolutegravir-LamiVUDine 600- MG per tablet    TRIUMEQ    30 tablet    Take 1 tablet by mouth daily    Human immunodeficiency virus (HIV) disease (H)       citalopram 20 MG tablet    celeXA    30 tablet    Take 1 tablet (20 mg) by mouth daily    Depression, unspecified depression type       HYDROcodone-acetaminophen 5-325 MG per tablet    NORCO     TK ONE T PO Q 6 H PRN P        trimethoprim-polymyxin b ophthalmic solution    POLYTRIM     INT 1 GTT IN EACH EYE Q 3 H WA FOR 10 DAYS

## 2017-08-02 NOTE — NURSING NOTE
"Chief Complaint   Patient presents with     Pre-Op Exam       Initial BP (!) 145/95 (BP Location: Right arm, Patient Position: Chair, Cuff Size: Adult Regular)  Pulse 109  Temp 97  F (36.1  C) (Oral)  Resp 16  Ht 5' 9\" (1.753 m)  Wt 173 lb (78.5 kg)  SpO2 97%  BMI 25.55 kg/m2 Estimated body mass index is 25.55 kg/(m^2) as calculated from the following:    Height as of this encounter: 5' 9\" (1.753 m).    Weight as of this encounter: 173 lb (78.5 kg).  Medication Reconciliation: complete   Aneta Ramachandran CMA (AAMA)      "

## 2017-08-02 NOTE — PROGRESS NOTES
[address] [address]  [phone]  Dept: [phone]    PRE-OP EVALUATION:  Today's date: 2017    John Barclay (: 1965) presents for pre-operative evaluation assessment as requested by Dr. Canales.  He requires evaluation and anesthesia risk assessment prior to undergoing surgery/procedure for treatment of Knee .  Proposed procedure: Knee    Date of Surgery/ Procedure: 8/3/2017  Time of Surgery/ Procedure: [facility]  Hospital/Surgical Facility: [facility]  Fax number for surgical facility: [phone]  Primary Physician: No primary care provider on file.  Type of Anesthesia Anticipated: General    Patient has a Health Care Directive or Living Will:  NO    1. NO - Do you have a history of heart attack, stroke, stent, bypass or surgery on an artery in the head, neck, heart or legs?  2. NO - Do you ever have any pain or discomfort in your chest?  3. NO - Do you have a history of  Heart Failure?  4. NO - Are you troubled by shortness of breath when: walking on the level, up a slight hill or at night?  5. NO - Do you currently have a cold, bronchitis or other respiratory infection?  6. NO - Do you have a cough, shortness of breath or wheezing?  7. NO - Do you sometimes get pains in the calves of your legs when you walk?  8. NO - Do you or anyone in your family have previous history of blood clots?  9. NO - Do you or does anyone in your family have a serious bleeding problem such as prolonged bleeding following surgeries or cuts?  10. NO - Have you ever had problems with anemia or been told to take iron pills?  11. NO - Have you had any abnormal blood loss such as black, tarry or bloody stools, or abnormal vaginal bleeding?  12. NO - Have you ever had a blood transfusion?  13. NO - Have you or any of your relatives ever had problems with anesthesia?  14. NO - Do you have sleep apnea, excessive snoring or daytime drowsiness?  15. NO - Do you have any prosthetic heart  valves?  16. NO - Do you have prosthetic joints?  17. NO - Is there any chance that you may be pregnant?        HPI:                                                      Brief HPI related to upcoming procedure: medial meniscus tear       See problem list for active medical problems.  Problems all longstanding and stable, except as noted/documented.  See ROS for pertinent symptoms related to these conditions.                                                                                                  .    MEDICAL HISTORY:                                                    Patient Active Problem List    Diagnosis Date Noted     Situational depression 01/30/2017     Priority: Medium     Gonorrhea of pharynx 01/30/2017     Priority: Medium     Uncomplicated alcohol dependence (H) 08/21/2016     Priority: Medium     Methamphetamine abuse in remission 07/08/2016     Priority: Medium     He went through a 30 day program at [facility] in June, 2016. He is now at a day treatment program [facility] for another 30 days.       Preventative health care 02/25/2016     Priority: Medium     Human immunodeficiency virus (HIV) disease (H) 02/25/2016     Priority: Medium     Syphilis in male 02/19/2016     Priority: Medium     Rash of entire body 02/14/2016     Priority: Medium     Allergic rhinitis due to other allergen 04/27/2004     Priority: Medium     Pain in joint, lower leg 04/27/2004     Priority: Medium     Elevated blood pressure reading without diagnosis of hypertension 04/27/2004     Priority: Medium      Past Medical History:   Diagnosis Date     Allergic rhinitis due to other allergen     mostly fall - hay fever      Past Surgical History:   Procedure Laterality Date     NO HISTORY OF SURGERY       Current Outpatient Prescriptions   Medication Sig Dispense Refill     citalopram (CELEXA) 20 MG tablet Take 1 tablet (20 mg) by mouth daily 30 tablet 11     abacavir-dolutegravir-LamiVUDine (TRIUMEQ) 600- MG per  "tablet Take 1 tablet by mouth daily 30 tablet 11     OTC products: None, except as noted above    Allergies   Allergen Reactions     No Known Drug Allergies       Latex Allergy: NO    Social History   Substance Use Topics     Smoking status: Current Every Day Smoker     Packs/day: 0.25     Years: 4.00     Types: Cigarettes     Last attempt to quit: 1/1/1994     Smokeless tobacco: Never Used      Comment: 4 years x 1/2 ppd     Alcohol use 0.0 oz/week     0 Standard drinks or equivalent per week      Comment: very occasionaly, rarely      History   Drug Use No       REVIEW OF SYSTEMS:                                                    Constitutional, neuro, ENT, endocrine, pulmonary, cardiac, gastrointestinal, genitourinary, musculoskeletal, integument and psychiatric systems are negative, except as otherwise noted.      EXAM:                                                    BP (!) 145/95 (BP Location: Right arm, Patient Position: Chair, Cuff Size: Adult Regular)  Pulse 109  Temp 97  F (36.1  C) (Oral)  Resp 16  Ht 5' 9\" (1.753 m)  Wt 173 lb (78.5 kg)  SpO2 97%  BMI 25.55 kg/m2    GENERAL APPEARANCE: healthy, alert and no distress     EYES: EOMI,  PERRL     HENT: ear canals and TM's normal and nose and mouth without ulcers or lesions     NECK: no adenopathy, no asymmetry, masses, or scars and thyroid normal to palpation     RESP: lungs clear to auscultation - no rales, rhonchi or wheezes     CV: regular rates and rhythm, normal S1 S2, no S3 or S4 and no murmur, click or rub     MS: extremities normal- no gross deformities noted, no evidence of inflammation in joints, FROM in all extremities.     SKIN: no suspicious lesions or rashes     NEURO: Normal strength and tone, sensory exam grossly normal, mentation intact and speech normal     PSYCH: mentation appears normal. and affect normal/bright     LYMPHATICS: No axillary, cervical, or supraclavicular nodes    DIAGNOSTICS:                                        "             No labs or EKG required for low risk surgery (cataract, skin procedure, breast biopsy, etc)  EKG: Not indicated due to non-vascular surgery and low risk of event (age <65 and without cardiac risk factors)    Recent Labs   Lab Test  01/26/17   1012  08/18/16   1111   02/16/16   1059  02/16/16   0833   HGB  16.5  15.7   < >   --   10.3*   PLT  282  245   < >   --   256   INR   --    --    --   0.98   --    NA  140  140   < >   --   134   POTASSIUM  3.8  3.6   < >   --   3.8   CR  0.98  0.97   < >   --   0.65*   A1C   --    --    --    --   5.0    < > = values in this interval not displayed.        IMPRESSION:                                                    Reason for surgery/procedure: medial meniscus tear  Diagnosis/reason for consult: medical consult     The proposed surgical procedure is considered LOW risk.    REVISED CARDIAC RISK INDEX  The patient has the following serious cardiovascular risks for perioperative complications such as (MI, PE, VFib and 3  AV Block):  No serious cardiac risks  INTERPRETATION: 0 risks: Class I (very low risk - 0.4% complication rate)    The patient has the following additional risks for perioperative complications:  No identified additional risks  HIV positive         ICD-10-CM    1. Preop general physical exam Z01.818    2. Tear of medial meniscus of right knee, current, unspecified tear type, initial encounter S83.241A    3. Human immunodeficiency virus (HIV) disease (H) B20        RECOMMENDATIONS:                                                        --Patient is to take all scheduled medications on the day of surgery   Of note, he is is HIV positive       APPROVAL GIVEN to proceed with proposed procedure, without further diagnostic evaluation       Signed Electronically by: BYRON De León CNP    Copy of this evaluation report is provided to requesting physician.    [facility] Preop Guidelines

## 2017-08-25 DIAGNOSIS — B20 HUMAN IMMUNODEFICIENCY VIRUS (HIV) DISEASE (H): Primary | ICD-10-CM

## 2017-08-25 RX ORDER — ABACAVIR SULFATE, DOLUTEGRAVIR SODIUM, LAMIVUDINE 600; 50; 300 MG/1; MG/1; MG/1
TABLET, FILM COATED ORAL
Qty: 30 TABLET | Refills: 3 | Status: SHIPPED | OUTPATIENT
Start: 2017-08-25 | End: 2018-02-20

## 2017-08-27 ENCOUNTER — HOSPITAL ENCOUNTER (EMERGENCY)
Facility: CLINIC | Age: 52
Discharge: HOME | End: 2017-08-27
Attending: EMERGENCY MEDICINE | Admitting: EMERGENCY MEDICINE
Payer: COMMERCIAL

## 2017-08-27 VITALS
BODY MASS INDEX: 25.18 KG/M2 | HEART RATE: 96 BPM | DIASTOLIC BLOOD PRESSURE: 99 MMHG | HEIGHT: 69 IN | TEMPERATURE: 97.9 F | WEIGHT: 170 LBS | SYSTOLIC BLOOD PRESSURE: 145 MMHG | RESPIRATION RATE: 16 BRPM | OXYGEN SATURATION: 97 %

## 2017-08-27 DIAGNOSIS — L02.11 ABSCESS OF NECK: ICD-10-CM

## 2017-08-27 PROCEDURE — 10060 I&D ABSCESS SIMPLE/SINGLE: CPT

## 2017-08-27 PROCEDURE — 76536 US EXAM OF HEAD AND NECK: CPT

## 2017-08-27 PROCEDURE — 99284 EMERGENCY DEPT VISIT MOD MDM: CPT | Mod: 25

## 2017-08-27 RX ORDER — CEPHALEXIN 500 MG/1
500 CAPSULE ORAL 4 TIMES DAILY
Qty: 40 CAPSULE | Refills: 0 | Status: SHIPPED | OUTPATIENT
Start: 2017-08-27 | End: 2017-09-06

## 2017-08-27 ASSESSMENT — ENCOUNTER SYMPTOMS
FEVER: 0
VOMITING: 0
NAUSEA: 0
CHILLS: 0

## 2017-08-27 NOTE — ED AVS SNAPSHOT
Emergency Department    [address] [place] [address]    Phone:  [phone]    Fax:  [phone]                                       John Barclay   MRN: [id number]    Department:   Emergency Department   Date of Visit:  8/27/2017           Patient Information     Date Of Birth          1965        Your diagnoses for this visit were:     Abscess of neck        You were seen by Nataly Braga MD.      Follow-up Information     Schedule an appointment as soon as possible for a visit to follow up.        Discharge Instructions       Warm Epsom salt soaks 2-3 times a day, Tylenol or Motrin as needed. Keflex for 10 days. Set up an appointment to see your doctor in the next couple of days for a recheck. If this wound does not start healing over the next few weeks, you may need to have it excised and biopsied by an ear nose and throat doctor or plastic surgeon.        Abscess (Incision & Drainage)  An abscess is sometimes called a boil. It happens when bacteria get trapped under the skin and start to grow. Pus forms inside the abscess as the body responds to the bacteria. An abscess can happen with an insect bite, ingrown hair, blocked oil gland, pimple, cyst, or puncture wound.  Your healthcare provider has drained the pus from your abscess. If the abscess pocket was large, your healthcare provider may have put in gauze packing. Your provider will need to remove it on your next visit. He or she may also replace it at that time. You may not need antibiotics to treat a simple abscess, unless the infection is spreading into the skin around the wound (cellulitis).  The wound will take about 1 to 2 weeks to heal, depending on the size of the abscess. Healthy tissue will grow from the bottom and sides of the opening until it seals over.  Home care  These tips can help your wound heal:    The wound may drain for the first 2 days. Cover the wound with a clean dry dressing. Change  the dressing if it becomes soaked with blood or pus.    If a gauze packing was placed inside the abscess pocket, you may be told to remove it yourself. You may do this in the shower. Once the packing is removed, you should wash the area in the shower, or clean the area as directed by your provider. Continue to do this until the skin opening has closed. Make sure you wash your hands after changing the packing or cleaning the wound.    If you were prescribed antibiotics, take them as directed until they are all gone.    You may use acetaminophen or ibuprofen to control pain, unless another pain medicine was prescribed. If you have liver disease or ever had a stomach ulcer, talk with your doctor before using these medicines.  Follow-up care  Follow up with your healthcare provider, or as advised. If a gauze packing was put in your wound, it should be removed in 1 to 2 days. Check your wound every day for any signs that the infection is getting worse. The signs are listed below.  When to seek medical advice  Call your healthcare provider right away if any of these occur:    Increasing redness or swelling    Red streaks in the skin leading away from the wound    Increasing local pain or swelling    Continued pus draining from the wound 2 days after treatment    Fever of 100.4 F (38 C) or higher, or as directed by your healthcare provider    Boil returns when you are at home  Date Last Reviewed: 9/1/2016 2000-2017 [employer]. [address] [place] [address]. All rights reserved. This information is not intended as a substitute for professional medical care. Always follow your healthcare professional's instructions.          Future Appointments        Provider Department Dept Phone Center    8/31/2017 8:30 AM Paty Whitaker MD [facility] [phone] [facility]      24 Hour Appointment Hotline       To make an appointment at any [facility], call  [phone] ([phone]). If you don't have a family doctor or clinic, we will help you find one. [facility] clinics are conveniently located to serve the needs of you and your family.             Review of your medicines      Our records show that you are taking the medicines listed below. If these are incorrect, please call your family doctor or clinic.        Dose / Directions Last dose taken    TRIUMEQ 600- MG per tablet   Quantity:  30 tablet   Generic drug:  abacavir-dolutegravir-LamiVUDine        TAKE ONE TABLET BY MOUTH EVERY DAY   Refills:  3                Procedures and tests performed during your visit     POC US SOFT TISSUE      Orders Needing Specimen Collection     None      Pending Results     Date and Time Order Name Status Description    8/27/2017 0848 POC US SOFT TISSUE In process             Pending Culture Results     No orders found from 8/25/2017 to 8/28/2017.            Pending Results Instructions     If you had any lab results that were not finalized at the time of your Discharge, you can call the ED Lab Result RN at [phone]. You will be contacted by this team for any positive Lab results or changes in treatment. The nurses are available 7 days a week from 10A to 6:30P.  You can leave a message 24 hours per day and they will return your call.        Test Results From Your Hospital Stay        8/27/2017 10:15 AM      Impression     Indication: Subcutaneous mass on the right side of the neck.  Findings: Some heterogeneous echogenicity noted, some fluid, a loculated appearance. No definite wall seen.                Clinical Quality Measure: Blood Pressure Screening     Your blood pressure was checked while you were in the emergency department today. The last reading we obtained was  BP: (!) 138/98 . Please read the guidelines below about what these numbers mean and what you should do about them.  If your systolic blood pressure (the top number) is less than 120 and your  diastolic blood pressure (the bottom number) is less than 80, then your blood pressure is normal. There is nothing more that you need to do about it.  If your systolic blood pressure (the top number) is 120-139 or your diastolic blood pressure (the bottom number) is 80-89, your blood pressure may be higher than it should be. You should have your blood pressure rechecked within a year by a primary care provider.  If your systolic blood pressure (the top number) is 140 or greater or your diastolic blood pressure (the bottom number) is 90 or greater, you may have high blood pressure. High blood pressure is treatable, but if left untreated over time it can put you at risk for heart attack, stroke, or kidney failure. You should have your blood pressure rechecked by a primary care provider within the next 4 weeks.  If your provider in the emergency department today gave you specific instructions to follow-up with your doctor or provider even sooner than that, you should follow that instruction and not wait for up to 4 weeks for your follow-up visit.        Thank you for choosing [facility]       Thank you for choosing [facility] for your care. Our goal is always to provide you with excellent care. Hearing back from our patients is one way we can continue to improve our services. Please take a few minutes to complete the written survey that you may receive in the mail after you visit with us. Thank you!        [employer] Information     hiyalife gives you secure access to your electronic health record. If you see a primary care provider, you can also send messages to your care team and make appointments. If you have questions, please call your primary care clinic.  If you do not have a primary care provider, please call [phone] and they will assist you.        Care EveryWhere ID     This is your Care EveryWhere ID. This could be used by other organizations to access your [facility] medical records  [id number]        Equal  Access to Services     [facility] : Robbie Brito, trena smith, qaangel brown. So [facility] [phone].    ATENCIÓN: Si habla español, tiene a carpio disposición servicios gratuitos de asistencia lingüística. Llame al [phone].    We comply with applicable federal civil rights laws and [place] laws. We do not discriminate on the basis of race, color, national origin, age, disability sex, sexual orientation or gender identity.            After Visit Summary       This is your record. Keep this with you and show to your community pharmacist(s) and doctor(s) at your next visit.

## 2017-08-27 NOTE — ED AVS SNAPSHOT
Emergency Department    [address] [place] [address]    Phone:  [phone]    Fax:  [phone]                                       John Barclay   MRN: [id number]    Department:   Emergency Department   Date of Visit:  8/27/2017           After Visit Summary Signature Page     I have received my discharge instructions, and my questions have been answered. I have discussed any challenges I see with this plan with the nurse or doctor.    ..........................................................................................................................................  Patient/Patient Representative Signature      ..........................................................................................................................................  Patient Representative Print Name and Relationship to Patient    ..................................................               ................................................  Date                                            Time    ..........................................................................................................................................  Reviewed by Signature/Title    ...................................................              ..............................................  Date                                                            Time

## 2017-08-27 NOTE — DISCHARGE INSTRUCTIONS
Warm Epsom salt soaks 2-3 times a day, Tylenol or Motrin as needed. Keflex for 10 days. Set up an appointment to see your doctor in the next couple of days for a recheck. If this wound does not start healing over the next few weeks, you may need to have it excised and biopsied by an ear nose and throat doctor or plastic surgeon.        Abscess (Incision & Drainage)  An abscess is sometimes called a boil. It happens when bacteria get trapped under the skin and start to grow. Pus forms inside the abscess as the body responds to the bacteria. An abscess can happen with an insect bite, ingrown hair, blocked oil gland, pimple, cyst, or puncture wound.  Your healthcare provider has drained the pus from your abscess. If the abscess pocket was large, your healthcare provider may have put in gauze packing. Your provider will need to remove it on your next visit. He or she may also replace it at that time. You may not need antibiotics to treat a simple abscess, unless the infection is spreading into the skin around the wound (cellulitis).  The wound will take about 1 to 2 weeks to heal, depending on the size of the abscess. Healthy tissue will grow from the bottom and sides of the opening until it seals over.  Home care  These tips can help your wound heal:    The wound may drain for the first 2 days. Cover the wound with a clean dry dressing. Change the dressing if it becomes soaked with blood or pus.    If a gauze packing was placed inside the abscess pocket, you may be told to remove it yourself. You may do this in the shower. Once the packing is removed, you should wash the area in the shower, or clean the area as directed by your provider. Continue to do this until the skin opening has closed. Make sure you wash your hands after changing the packing or cleaning the wound.    If you were prescribed antibiotics, take them as directed until they are all gone.    You may use acetaminophen or ibuprofen to control pain, unless  another pain medicine was prescribed. If you have liver disease or ever had a stomach ulcer, talk with your doctor before using these medicines.  Follow-up care  Follow up with your healthcare provider, or as advised. If a gauze packing was put in your wound, it should be removed in 1 to 2 days. Check your wound every day for any signs that the infection is getting worse. The signs are listed below.  When to seek medical advice  Call your healthcare provider right away if any of these occur:    Increasing redness or swelling    Red streaks in the skin leading away from the wound    Increasing local pain or swelling    Continued pus draining from the wound 2 days after treatment    Fever of 100.4 F (38 C) or higher, or as directed by your healthcare provider    Boil returns when you are at home  Date Last Reviewed: 9/1/2016 2000-2017 [employer]. [address] [place] [address], [place] [address]. All rights reserved. This information is not intended as a substitute for professional medical care. Always follow your healthcare professional's instructions.

## 2017-08-27 NOTE — ED PROVIDER NOTES
"  History     Chief Complaint:  Wound Check     HPI   John Barclay is a 52 year old male with a history of HIV who presents with wound check. The patient reports that \"a few weeks ago\" he noticed a lump to his right-sided neck. He states that this continued to increase in size and tenderness. The patient continues that he tried to pop this on his own at home and had some mild drainage of blood and puss. However, the persistence of his symptoms prompted him to come to the clinic for evaluation. Here, the patient denies any fever, chills, nausea, or vomiting. He denies any other areas of swelling as well as a history of diabetes or MRSA.       Allergies:  NKDA    Medications:    Trimeq    Past Medical History:    Allergic rhinitis  Situational depression  Gonorrhea of pharynx   HIV  Alcohol dependence  Methamphetamine abuse in remission   Syphilis   Elevated blood pressure     Past Surgical History:    No surgical history     Family History:    The patient reports a maternal history of arthritis and a fraternal history of breast cancer. He also reports a paternal history of colon cancer and diabetes. The patient denies any other relevant family history.     Social History:  The patient is . He reports current, every day tobacco use of 0.5 ppd. The patient denies alcohol use.     Review of Systems   Constitutional: Negative for chills and fever.   Gastrointestinal: Negative for nausea and vomiting.   Skin:        Lump to right neck, tender to palpation.   All other systems reviewed and are negative.    Physical Exam   First Vitals:  BP (!) 138/98  Pulse 91  Temp 97.9  F (36.6  C) (Oral)  Resp 16  Ht 1.753 m (5' 9\")  Wt 77.1 kg (170 lb)  SpO2 97%  BMI 25.1 kg/m2        Physical Exam  Gen.: Patient appears comfortable.  HEENT: No facial lesions. Mucous membranes are moist.  Skin: A large marble-sized erythematous firm round subcutaneous swelling over the right mid lateral neck. There is a 5 mm scab over " the top of this. There is no fluctuance. The redness does not extend beyond the mass.  Lymph: No adenopathy in the neck.  Cardiac: The carotid is easily palpable in front of this mass and is separate. Pulses regular.  Neuro: Cranial nerves are intact. He is awake and alert.    Emergency Department Course   Procedures:  POC US Soft Tissue:     Indication: Subcutaneous mass on the right side of the neck.  Findings: Some heterogeneous echogenicity noted, some fluid, a loculated appearance. No definite wall seen. Reading by Dr. Braga.       Procedure: Incision and Drainage   LOCATIONS:  Right-sided neck     ANESTHESIA:  Local field block using Marcaine 0.25% plain, total of 2 ccs     PROCEDURE:  Area was incised with # 11 Blade (Sharp Point).  Wound treatment included small amount of necrotic material and some puss, less than 1 cc. Minimal bleeding. Appropriate dressing was applied to cover the area.    Patient Status:   Patient tolerated the procedure well. There were no complications.    Emergency Department Course:  Nursing notes and vitals reviewed. I performed an exam of the patient as documented above.   09:05 Bedside ultrasound performed. Results as above. Findings discussed with the patient.   09:40 I&D performed per the above procedure note.   Findings and plan explained to the Patient. Patient discharged home with instructions regarding supportive care, medications, and reasons to return. The importance of close follow-up was reviewed.      Impression & Plan      Medical Decision Making:  Patient has what appears to be an abscess on the right side of his neck.  It started out as some swelling and redness with a pimple-like head.  Ultrasound showed some heterogeneous material with a the little bit of fluid and inflammatory change. After I anesthetized the area I then was able to make a small incision and get a small amount of necrotic and purulent material out. I could not put any packing in this. He does have  HIV and his counts have been normal. However, I told him if this is not healing over the next couple of weeks, he may need to have this excised and biopsied. I do not believe that this was a lymph node by its presentation or location superficially.      Diagnosis:    ICD-10-CM   1. Abscess of neck L02.11       Disposition:  Warm Epsom salt soaks 2-3 times a day, Tylenol or Motrin as needed. Keflex for 10 days. Set up an appointment to see your doctor in the next couple of days for a recheck. If this wound does not start healing over the next few weeks, you may need to have it excised and biopsied by an ear nose and throat doctor or plastic surgeon.        Discharge Medications:  New Prescriptions    CEPHALEXIN (KEFLEX) 500 MG CAPSULE    Take 1 capsule (500 mg) by mouth 4 times daily for 10 days           Tricia LEPE, am serving as a scribe at 8:44 AM on 8/27/2017 to document services personally performed by Nataly Braga MD, based on my observations and the provider's statements to me.        Nataly Braga MD  08/29/17 8922

## 2017-09-26 ENCOUNTER — HOSPITAL ENCOUNTER (EMERGENCY)
Facility: CLINIC | Age: 52
Discharge: HOME | End: 2017-09-26
Attending: EMERGENCY MEDICINE | Admitting: EMERGENCY MEDICINE
Payer: COMMERCIAL

## 2017-09-26 VITALS
SYSTOLIC BLOOD PRESSURE: 135 MMHG | HEIGHT: 69 IN | HEART RATE: 97 BPM | DIASTOLIC BLOOD PRESSURE: 85 MMHG | TEMPERATURE: 98.6 F | OXYGEN SATURATION: 100 % | RESPIRATION RATE: 18 BRPM

## 2017-09-26 DIAGNOSIS — F15.10 METHAMPHETAMINE USE (H): ICD-10-CM

## 2017-09-26 DIAGNOSIS — L02.31 ABSCESS OF BUTTOCK, RIGHT: ICD-10-CM

## 2017-09-26 DIAGNOSIS — B20 HUMAN IMMUNODEFICIENCY VIRUS (HIV) DISEASE (H): ICD-10-CM

## 2017-09-26 DIAGNOSIS — L03.317 CELLULITIS OF BUTTOCK, RIGHT: ICD-10-CM

## 2017-09-26 LAB
BACTERIA SPEC CULT: NORMAL
Lab: NORMAL
SPECIMEN SOURCE: NORMAL

## 2017-09-26 PROCEDURE — 25000132 ZZH RX MED GY IP 250 OP 250 PS 637: Performed by: EMERGENCY MEDICINE

## 2017-09-26 PROCEDURE — 87070 CULTURE OTHR SPECIMN AEROBIC: CPT | Performed by: EMERGENCY MEDICINE

## 2017-09-26 PROCEDURE — 99284 EMERGENCY DEPT VISIT MOD MDM: CPT | Mod: 25

## 2017-09-26 PROCEDURE — 10060 I&D ABSCESS SIMPLE/SINGLE: CPT

## 2017-09-26 PROCEDURE — 87186 SC STD MICRODIL/AGAR DIL: CPT | Performed by: EMERGENCY MEDICINE

## 2017-09-26 PROCEDURE — 87077 CULTURE AEROBIC IDENTIFY: CPT | Performed by: EMERGENCY MEDICINE

## 2017-09-26 RX ORDER — SULFAMETHOXAZOLE/TRIMETHOPRIM 800-160 MG
1 TABLET ORAL 2 TIMES DAILY
Status: DISCONTINUED | OUTPATIENT
Start: 2017-09-26 | End: 2017-09-26

## 2017-09-26 RX ORDER — CEPHALEXIN 500 MG/1
500 CAPSULE ORAL ONCE
Status: COMPLETED | OUTPATIENT
Start: 2017-09-26 | End: 2017-09-26

## 2017-09-26 RX ORDER — CEPHALEXIN 500 MG/1
500 CAPSULE ORAL 4 TIMES DAILY
Qty: 28 CAPSULE | Refills: 0 | Status: SHIPPED | OUTPATIENT
Start: 2017-09-26 | End: 2017-09-29 | Stop reason: ALTCHOICE

## 2017-09-26 RX ORDER — SULFAMETHOXAZOLE/TRIMETHOPRIM 800-160 MG
1 TABLET ORAL 2 TIMES DAILY
Qty: 14 TABLET | Refills: 0 | Status: SHIPPED | OUTPATIENT
Start: 2017-09-26 | End: 2017-09-29 | Stop reason: ALTCHOICE

## 2017-09-26 RX ORDER — SULFAMETHOXAZOLE/TRIMETHOPRIM 800-160 MG
1 TABLET ORAL ONCE
Status: COMPLETED | OUTPATIENT
Start: 2017-09-26 | End: 2017-09-26

## 2017-09-26 RX ADMIN — SULFAMETHOXAZOLE AND TRIMETHOPRIM 1 TABLET: 800; 160 TABLET ORAL at 16:08

## 2017-09-26 RX ADMIN — CEPHALEXIN 500 MG: 500 CAPSULE ORAL at 16:08

## 2017-09-26 ASSESSMENT — ENCOUNTER SYMPTOMS
DIAPHORESIS: 0
VOMITING: 0
NAUSEA: 0
FEVER: 0
WOUND: 1
ABDOMINAL PAIN: 0

## 2017-09-26 NOTE — ED AVS SNAPSHOT
Emergency Department    [address] [facility] [address]    Phone:  [phone]    Fax:  [phone]                                       John Barclay   MRN: [id number]    Department:   Emergency Department   Date of Visit:  9/26/2017           Patient Information     Date Of Birth          1965        Your diagnoses for this visit were:     Abscess of buttock, right     Cellulitis of buttock, right     Human immunodeficiency virus (HIV) disease (H)     Methamphetamine use        You were seen by Zeyad Preciado MD.      Follow-up Information     Follow up with your Primary Clinic. Schedule an appointment as soon as possible for a visit in 2 days.    Why:  for recheck of your wound        Follow up with  Emergency Department.    Specialty:  EMERGENCY MEDICINE    Why:  As needed, If symptoms worsen    Contact information:    [address] [facility] [id number] [phone]      Discharge References/Attachments     ABSCESS, INCISION AND DRAINAGE (ENGLISH)      Future Appointments        Provider Department Dept Phone Center    9/28/2017 10:00 AM Paty Whitaker MD [facility] [phone] [facility]      24 Hour Appointment Hotline       To make an appointment at any [facility], call [phone] ([phone]). If you don't have a family doctor or clinic, we will help you find one. [facility] are conveniently located to serve the needs of you and your family.             Review of your medicines      START taking        Dose / Directions Last dose taken    cephALEXin 500 MG capsule   Commonly known as:  KEFLEX   Dose:  500 mg   Quantity:  28 capsule        Take 1 capsule (500 mg) by mouth 4 times daily for 7 days   Refills:  0        sulfamethoxazole-trimethoprim 800-160 MG per tablet   Commonly known as:  BACTRIM DS   Dose:  1 tablet   Quantity:  14 tablet        Take 1 tablet by mouth 2 times daily for  7 days   Refills:  0          Our records show that you are taking the medicines listed below. If these are incorrect, please call your family doctor or clinic.        Dose / Directions Last dose taken    TRIUMEQ 600- MG per tablet   Quantity:  30 tablet   Generic drug:  abacavir-dolutegravir-LamiVUDine        TAKE ONE TABLET BY MOUTH EVERY DAY   Refills:  3                Prescriptions were sent or printed at these locations (2 Prescriptions)                   Other Prescriptions                Printed at Department/Unit printer (2 of 2)         cephALEXin (KEFLEX) 500 MG capsule               sulfamethoxazole-trimethoprim (BACTRIM DS) 800-160 MG per tablet                Procedures and tests performed during your visit     POC US SOFT TISSUE    Wound Culture Aerobic Bacterial      Orders Needing Specimen Collection     None      Pending Results     Date and Time Order Name Status Description    9/26/2017 1528 POC US SOFT TISSUE In process             Pending Culture Results     No orders found from 9/24/2017 to 9/27/2017.            Pending Results Instructions     If you had any lab results that were not finalized at the time of your Discharge, you can call the ED Lab Result RN at [phone]. You will be contacted by this team for any positive Lab results or changes in treatment. The nurses are available 7 days a week from 10A to 6:30P.  You can leave a message 24 hours per day and they will return your call.        Test Results From Your Hospital Stay        9/26/2017  3:28 PM      Result not yet available     Exam Begun                Clinical Quality Measure: Blood Pressure Screening     Your blood pressure was checked while you were in the emergency department today. The last reading we obtained was  BP: (!) 143/91 . Please read the guidelines below about what these numbers mean and what you should do about them.  If your systolic blood pressure (the top number) is less than 120 and your diastolic  blood pressure (the bottom number) is less than 80, then your blood pressure is normal. There is nothing more that you need to do about it.  If your systolic blood pressure (the top number) is 120-139 or your diastolic blood pressure (the bottom number) is 80-89, your blood pressure may be higher than it should be. You should have your blood pressure rechecked within a year by a primary care provider.  If your systolic blood pressure (the top number) is 140 or greater or your diastolic blood pressure (the bottom number) is 90 or greater, you may have high blood pressure. High blood pressure is treatable, but if left untreated over time it can put you at risk for heart attack, stroke, or kidney failure. You should have your blood pressure rechecked by a primary care provider within the next 4 weeks.  If your provider in the emergency department today gave you specific instructions to follow-up with your doctor or provider even sooner than that, you should follow that instruction and not wait for up to 4 weeks for your follow-up visit.        Thank you for choosing [facility]       Thank you for choosing [facility] for your care. Our goal is always to provide you with excellent care. Hearing back from our patients is one way we can continue to improve our services. Please take a few minutes to complete the written survey that you may receive in the mail after you visit with us. Thank you!        [employer] Information     [facility] gives you secure access to your electronic health record. If you see a primary care provider, you can also send messages to your care team and make appointments. If you have questions, please call your primary care clinic.  If you do not have a primary care provider, please call [phone] and they will assist you.        Care EveryWhere ID     This is your Care EveryWhere ID. This could be used by other organizations to access your [facility] medical records  [id number]        Equal Access to  Services     [facility]: Robbie Brito, wamemo lucarsonadaha, qaangel brown. So [facility] [phone].    ATENCIÓN: Si habla español, tiene a carpio disposición servicios gratuitos de asistencia lingüística. Llame al [phone].    We comply with applicable federal civil rights laws and [place] laws. We do not discriminate on the basis of race, color, national origin, age, disability sex, sexual orientation or gender identity.            After Visit Summary       This is your record. Keep this with you and show to your community pharmacist(s) and doctor(s) at your next visit.

## 2017-09-26 NOTE — ED PROVIDER NOTES
"  History     Chief Complaint:  Right buttock sore    HPI   John Barclay is a 52 year old male, with a history of alcohol dependence, methamphetamine abuse, HIV, and syphilis, who presents to the ED for evaluation of an abscess on his right buttock. The patient reports he has some red spots on his chest, legs, and buttock, but his main concern is that he has an abscess on his R buttock.  No fevers.  No drainage.  The patient denies any  diaphoresis, nausea, vomiting, abdominal pain, or sores anywhere else. Of note, the patient reports he last used methamphetamine 5 days ago, and declines help; \"I fell off the wagon.\"  He is compliant with his antiretrovirals and reports his last CD4 count was very high and he follows closely with his ID clinic.      Allergies:  No known drug allergies    Medications:    TRIUMEQ 600- MG per tablet      Past Medical History:    Depression  Pharynx gonorrhea  Alcohol dependence   Methamphetamine abuse  HIV   Syphilis  Allergic rhinitis    Past Surgical History:    History reviewed. No pertinent surgical history.    Family History:    Breast cancer  Diabetes  Colorectal cancer  Arthritis     Social History:  Smoking status: Current every day smoker  Alcohol use: Yes  Presents to ED alone  Marital Status:   [2]     Review of Systems   Constitutional: Negative for diaphoresis and fever.   Gastrointestinal: Negative for abdominal pain, nausea and vomiting.   Skin: Positive for wound.   All other systems reviewed and are negative.    Physical Exam     Patient Vitals for the past 24 hrs:   BP Temp Temp src Pulse Resp SpO2 Height   09/26/17 1616 135/85 - - - - - -   09/26/17 1423 (!) 143/91 98.6  F (37  C) Oral 97 18 100 % 1.753 m (5' 9\")     Physical Exam  General: male semi-recumbent in room 20  HENT: mucous membranes moist  CV: regular rate, regular rhythm, no murmur audible  Resp: normal effort, clear throughout, no crackles or wheezing  GI: abdomen soft and nontender, no " guarding  MSK: no bony tenderness  Skin: multiple small patches of erythema without induration to central chest and arms c/w localized cellulitis  Approximately 8cm diameter blanching erythema to R central buttock with central induration and significant tenderness  Neuro: alert, clear speech, oriented, no meningismus, ambulatory  Psych: calm, cooperative, very appreciative    Emergency Department Course     Imaging:  Radiographic findings were communicated with the patient who voiced understanding of the findings.    POC US Soft Tissue  Performed by MARIANGEL Preciado MD  POC US SOFT TISSUE (Final result) Result time: 09/27/17 15:30:33     Final result by Zeyad Preciado MD (09/27/17 15:30:33)     Impression:     ED Bedside Limited Ultrasound  Body area scanned: R buttock  Performed by: Zeyad Preciado MD  Indication: swelling/pain, eval for fluid collection  Findings/Interpretation: +small fluid collection consistent with superficial abscess         Laboratory:  Wound culture aerobic bacterial: In process    Procedures:  Procedure: Incision and Drainage   Performed by: Zeyad Preciado MD    LOCATION:  R buttock      ANESTHESIA:  Local field block using Marcaine 0.5% with epinephrine, total of 5 mLs    PREPARATION:  Cleansed with Betadine    PROCEDURE:  Area was incised with # 11 Blade (Sharp Point) with cruciate incision.  Wound treatment included Deloculation, Purulent Drainage and Expression of Material.  Packing consisted of No Packing.  Appropriate dressing was applied to cover the area.    Patient Status:  Patient tolerated the procedure moderately well. There were no complications evident      Interventions:  1608: Keflex 500mg Oral   1608: Bactrim/Septra 800-160mg 1 tablet Oral     Emergency Department Course:  Past medical records, nursing notes, and vitals reviewed.  1524: I performed an exam of the patient and obtained history, as documented above.    1530: I performed the portable soft tissue  ultrasound as noted above.     1541: I performed the incision and drainage as noted above.     Findings and plan explained to the Patient. Patient discharged home with instructions regarding supportive care, medications, and reasons to return. The importance of close follow-up was reviewed.     Impression & Plan      Medical Decision Making:  This 52 year old methamphetamine user with apparently well controlled HIV presents with evidence of abscess to his right buttock and some mild surrounding cellulitis. Incision and drainage was performed as documented above. Do not think he requires IV antibiotics or hospitalization. His vitals are normal. He reports that his HIV is under good control, and he is compliant with his antiretrovirals, and has a local ID physician. Culture was sent given his immunocompromise and cellulitis, and antibiotics to cover for MRSA were initiated given epidemiologic risk factors. He does have a few other scattered patches of erythema consistent with localized cellulitis. There is no other abscess to drain, though he is at risk for developing such.  He was content with this plan, and discharged home in improved condition.      Diagnosis:    ICD-10-CM    1. Abscess of buttock, right L02.31 Wound Culture Aerobic Bacterial   2. Cellulitis of buttock, right L03.317    3. Human immunodeficiency virus (HIV) disease (H) B20    4. Methamphetamine use F15.10      Disposition: Patient discharged to home     Discharge Medications:   Details   cephALEXin (KEFLEX) 500 MG capsule Take 1 capsule (500 mg) by mouth 4 times daily for 7 days, Disp-28 capsule, R-0, Local Print      sulfamethoxazole-trimethoprim (BACTRIM DS) 800-160 MG per tablet Take 1 tablet by mouth 2 times daily for 7 days, Disp-14 tablet, R-0, Local Print     Ayaka Musa  9/26/2017    EMERGENCY DEPARTMENT    I, Ayaka Musa, am serving as a scribe at 3:24 PM on 9/26/2017 to document services personally performed by Zeyad Preciado  MD Dwain based on my observations and the provider's statements to me.        Zeyad Preciado MD  09/27/17 0724

## 2017-09-26 NOTE — ED NOTES
"Pt denies bug bites or bed bugs stated \"I am a recovering meth addict but I started up again.  Maybe that has something to do with it\"  "

## 2017-09-26 NOTE — ED AVS SNAPSHOT
Emergency Department    [address] [place] [address]    Phone:  [phone]    Fax:  [phone]                                       John Barclay   MRN: [id number]    Department:   Emergency Department   Date of Visit:  9/26/2017           After Visit Summary Signature Page     I have received my discharge instructions, and my questions have been answered. I have discussed any challenges I see with this plan with the nurse or doctor.    ..........................................................................................................................................  Patient/Patient Representative Signature      ..........................................................................................................................................  Patient Representative Print Name and Relationship to Patient    ..................................................               ................................................  Date                                            Time    ..........................................................................................................................................  Reviewed by Signature/Title    ...................................................              ..............................................  Date                                                            Time

## 2017-09-28 ENCOUNTER — TELEPHONE (OUTPATIENT)
Dept: EMERGENCY MEDICINE | Facility: CLINIC | Age: 52
End: 2017-09-28

## 2017-09-28 DIAGNOSIS — A49.02 MRSA INFECTION: ICD-10-CM

## 2017-09-28 DIAGNOSIS — B20 HUMAN IMMUNODEFICIENCY VIRUS (HIV) DISEASE (H): Primary | ICD-10-CM

## 2017-09-28 DIAGNOSIS — Z86.19 HISTORY OF SYPHILIS: ICD-10-CM

## 2017-09-28 DIAGNOSIS — Z00.00 PREVENTATIVE HEALTH CARE: ICD-10-CM

## 2017-09-28 LAB
BACTERIA SPEC CULT: ABNORMAL
Lab: ABNORMAL
SPECIMEN SOURCE: ABNORMAL

## 2017-09-28 NOTE — TELEPHONE ENCOUNTER
"[facility] Emergency Department Lab result notification:    [facility] ED lab result protocol used  Abscess     Reason for call  Notify of lab results, assess symptoms,  review ED providers recommendations/discharge instructions (if necessary) and advise per ED lab result f/u protocol    Lab Result  Final Abscess culture report on 9/28/17  [facility] ED discharge antibiotic: Cephalexin (Keflex) 500 mg capsule, 1 capsule (500 mg) PO QID for 7 days AND Sulfamethoxazole-Trimethoprim (Bactrim DS, Septra DS) 800-160 mg PO tablet,  PO BID for 7 days  Bacteria, Heavy growth Methicillin resistant Staphylococcus aureus (MRSA) , which is [RESISTANT] to ED discharge antibiotics, Keflex and Bactrim DS.  Incision and Drainage performed in the [facility] ED: Yes  Patient to be notified of result, symptoms's assessed and advised per [facility] ED lab result protocol.  Information table from ED Provider visit on 9/26/17  Symptoms reported at ED visit (Chief complaint, HPI) Chief Complaint:  Right buttock sore   HPI   John Barclay is a 52 year old male, with a history of alcohol dependence, methamphetamine abuse, HIV, and syphilis, who presents to the ED for evaluation of an abscess on his right buttock. The patient reports he has some red spots on his chest, legs, and buttock, but his main concern is that he has an abscess on his R buttock.  No fevers.  No drainage.  The patient denies any  diaphoresis, nausea, vomiting, abdominal pain, or sores anywhere else. Of note, the patient reports he last used methamphetamine 5 days ago, and declines help; \"I fell off the wagon.\"  He is compliant with his antiretrovirals and reports his last CD4 count was very high and he follows closely with his ID clinic.      ED providers Impression and Plan (applicable information) Medical Decision Making:  This 52 year old methamphetamine user with apparently well controlled HIV presents with evidence of abscess to his right buttock and some " mild surrounding cellulitis. Incision and drainage was performed as documented above. Do not think he requires IV antibiotics or hospitalization. His vitals are normal. He reports that his HIV is under good control, and he is compliant with his antiretrovirals, and has a local ID physician. Culture was sent given his immunocompromise and cellulitis, and antibiotics to cover for MRSA were initiated given epidemiologic risk factors. He does have a few other scattered patches of erythema consistent with localized cellulitis. There is no other abscess to drain, though he is at risk for developing such.  He was content with this plan, and discharged home in improved condition.       Diagnosis:      ICD-10-CM     1. Abscess of buttock, right L02.31 Wound Culture Aerobic Bacterial   2. Cellulitis of buttock, right L03.317     3. Human immunodeficiency virus (HIV) disease (H) B20     4. Methamphetamine use F15.10       Miscellaneous information MRSA     RN Assessment (Patient s current Symptoms), include time called.  [Insert Left message here if message left]  4:57 pm Message left to call us back at [phone], between 10 am and 6 pm, seven days a week. May leave a message 24/7, if no one available.   We need to assess abscess, inform of MRSA and contact ED provider since it is resistant to both antibiotics he is taking.     PCP follow-up Questions asked: NO    [RN Name]  Karrie Smith RN  [facility] RN  Lung Nodule and ED Lab Result F/u RN  Epic pool (ED late result f/u RN): [id number]  # [phone]    Copy of Lab result   Component Collected Lab   Specimen Description 09/26/2017  3:40 PM 75   Right Buttock Abscess   Special Requests 09/26/2017  3:40 PM 75   Specimen collected in eSwab transport (white cap)   This specimen was received on a swab. Results may not be optimal. For maximum sensitivity   of detection, submit tissue, fluid, or needle aspirate.      Culture Micro (Abnormal) 09/26/2017  3:40 PM 75    Heavy growth   Methicillin resistant Staphylococcus aureus (MRSA)   This isolate DOES NOT demonstrate inducible clindamycin resistance in vitro. Clindamycin   is susceptible and could be used when indicated, however, erythromycin is resistant and   should not be used.      Culture & Susceptibility   METHICILLIN RESISTANT STAPHYLOCOCCUS AUREUS (MRSA)   Antibiotic Interpretation Sensitivity Unit Method Status   CIPROFLOXACIN Resistant >=8 ug/mL PAYAM Final   CLINDAMYCIN Sensitive <=0.25 ug/mL PAYAM Final   ERYTHROMYCIN Resistant >=8 ug/mL PAYAM Final   GENTAMICIN Sensitive <=0.5 ug/mL PAYAM Final   LEVOFLOXACIN Resistant 4 ug/mL PAYAM Final   LINEZOLID Sensitive 2 ug/mL PAYAM Final   OXACILLIN Resistant >=4 ug/mL PAYAM Final   PENICILLIN Resistant >=0.5 ug/mL PAYAM Final   TETRACYCLINE Sensitive <=1 ug/mL PAYAM Final   Trimethoprim/Sulfa Resistant 4/76 ug/mL PAYAM Final   VANCOMYCIN Sensitive 1 ug/mL PAYAM Final

## 2017-09-29 ENCOUNTER — TELEPHONE (OUTPATIENT)
Dept: INFECTIOUS DISEASES | Facility: CLINIC | Age: 52
End: 2017-09-29

## 2017-09-29 RX ORDER — CLINDAMYCIN HCL 300 MG
300 CAPSULE ORAL
Qty: 20 CAPSULE | Refills: 0 | Status: SHIPPED | OUTPATIENT
Start: 2017-09-29 | End: 2017-10-09

## 2017-09-29 NOTE — TELEPHONE ENCOUNTER
"----- Message from Paty Whitaker MD sent at 9/28/2017  3:16 PM CDT -----  Regarding: RE: DSC Frosch  Sorry  - I did not see this until now.  I am happy to see him next week.  He can be double booked with [facility].      The lesion is culture positive for staph.  Is it getting better.  If not, I can prescribe some antibiotics as well.      He is also due for lab, so if he wants to come in early for labs, that might be helpful.    ----- Message -----     From: Aminata Wilde RN     Sent: 9/25/2017   5:02 PM       To: Paty Whitaker MD  Subject: John Mast Dr called to say he has a rash on his chest, arms, legs and knees................. He seems like a fairly calm orlin, but this seems to really be bothering him.    He says he had like a boil on his neck that a doctor cut open for him but instead of discharging fluid, it was like dry chalk inside?? And he feels this is what he has again (similar) on his butt and he indicates \"all of these\".    Would you want to add him on somewhere?  Would you like me to ask him to go to PCP or Urgent care?    Aminata Wilde RN      "

## 2017-09-29 NOTE — TELEPHONE ENCOUNTER
"[facility] Emergency Department Lab result notification     Patient/parent Name  Arnold Barclay RN Assessment (Patient s current Symptoms), include time called.  [Insert Left message here if message left]  Smaller areas of redness now worsening, is draining pus when scab is picked per Arnold's report.  Area that was incised is \"better\".      RN Recommendations/Instructions per [facility] ED lab result protocol  Did discuss with treating provider , order given for Clindamycin 300 mg PO BID x 10 days initiated.  Patient advised to discontinue both Bactim and Keflex.  Did review general information including infection control measures with Arnold.  Verbalizes understanding.     Please Contact your PCP clinic or return to the Emergency department if your:    Symptoms return.    Symptoms do not improve after 3 days on antibiotic.    Symptoms do not resolve after completing antibiotic.    Symptoms worsen or other concerning symptom's.    PCP follow-up Questions asked: NO    Meenu Sotelo RN    [facility] RN  Lung Nodule and ED Lab Results F/U RN  Epic pool (ED late result f/u RN) : [id number]   # [phone]  "

## 2017-09-29 NOTE — TELEPHONE ENCOUNTER
Called pt to relay information from Dr Whitaker below. Pt reports he went to ED and they cultured his abscess. They have given him antibiotics for this. Pt reports he will call the clinic if he has any new concerns.  Aminata Wilde RN

## 2017-10-09 ENCOUNTER — TRANSFERRED RECORDS (OUTPATIENT)
Dept: HEALTH INFORMATION MANAGEMENT | Facility: CLINIC | Age: 52
End: 2017-10-09

## 2017-10-26 ENCOUNTER — TELEPHONE (OUTPATIENT)
Dept: INFECTIOUS DISEASES | Facility: CLINIC | Age: 52
End: 2017-10-26

## 2017-10-26 DIAGNOSIS — R19.7 DIARRHEA: Primary | ICD-10-CM

## 2017-10-26 NOTE — TELEPHONE ENCOUNTER
Pt called reporting diarrhea that has been ongoing for about 11 days. He says [place] [facility] [place], but they told him to see his doctor if the diarrhea did not resolve. He is wondering if he could see Dr. Whitaker soon or if he should see someone else. Will discuss with Dr. Whitaker.  Luma Kovacs RN

## 2017-10-30 NOTE — TELEPHONE ENCOUNTER
Spoke with Dr. Whitaker re: pt's symptoms. She does not feel pt has an infectious process going on but needs to see GI. Called pt and he is amenable to this. Will put in referral for GI and have clinic coordinator help schedule. Pt also plans on coming to appt with Dr. Whitaker on Thursday 11/2.  Luma Kovacs RN

## 2017-10-30 NOTE — TELEPHONE ENCOUNTER
"Pt called back, saying diarrhea persisting. Not worsening but says \"looks like food almost not being processed.\" Stool brownish green. Denies blood, N/V. Tested neg [place] [facility]. Denies dizziness, double vision, lightheadedness. Says he is staying hydrated. Asking to be seen sooner than 11/2/17. Please advise at [phone]. Sent to [employer].  "

## 2018-02-01 ENCOUNTER — OFFICE VISIT (OUTPATIENT)
Dept: INFECTIOUS DISEASES | Facility: CLINIC | Age: 53
End: 2018-02-01
Attending: INTERNAL MEDICINE
Payer: COMMERCIAL

## 2018-02-01 VITALS
SYSTOLIC BLOOD PRESSURE: 171 MMHG | DIASTOLIC BLOOD PRESSURE: 98 MMHG | HEIGHT: 69 IN | TEMPERATURE: 97.8 F | HEART RATE: 106 BPM | BODY MASS INDEX: 28.3 KG/M2 | WEIGHT: 191.1 LBS

## 2018-02-01 DIAGNOSIS — F32.2 SEVERE SINGLE CURRENT EPISODE OF MAJOR DEPRESSIVE DISORDER, WITHOUT PSYCHOTIC FEATURES (H): ICD-10-CM

## 2018-02-01 DIAGNOSIS — B20 HUMAN IMMUNODEFICIENCY VIRUS (HIV) DISEASE (H): ICD-10-CM

## 2018-02-01 DIAGNOSIS — Z00.00 PREVENTATIVE HEALTH CARE: Primary | ICD-10-CM

## 2018-02-01 DIAGNOSIS — A53.9 SYPHILIS IN MALE: ICD-10-CM

## 2018-02-01 PROCEDURE — 90686 IIV4 VACC NO PRSV 0.5 ML IM: CPT | Mod: ZF | Performed by: INTERNAL MEDICINE

## 2018-02-01 PROCEDURE — G0008 ADMIN INFLUENZA VIRUS VAC: HCPCS | Mod: ZF

## 2018-02-01 PROCEDURE — 25000128 H RX IP 250 OP 636: Mod: ZF | Performed by: INTERNAL MEDICINE

## 2018-02-01 PROCEDURE — G0463 HOSPITAL OUTPT CLINIC VISIT: HCPCS | Mod: 25,ZF

## 2018-02-01 RX ADMIN — INFLUENZA A VIRUS A/MICHIGAN/45/2015 X-275 (H1N1) ANTIGEN (FORMALDEHYDE INACTIVATED), INFLUENZA A VIRUS A/HONG KONG/4801/2014 X-263B (H3N2) ANTIGEN (FORMALDEHYDE INACTIVATED), INFLUENZA B VIRUS B/PHUKET/3073/2013 ANTIGEN (FORMALDEHYDE INACTIVATED), AND INFLUENZA B VIRUS B/BRISBANE/60/2008 ANTIGEN (FORMALDEHYDE INACTIVATED) 0.5 ML: 15; 15; 15; 15 INJECTION, SUSPENSION INTRAMUSCULAR at 09:46

## 2018-02-01 ASSESSMENT — PAIN SCALES - GENERAL: PAINLEVEL: NO PAIN (0)

## 2018-02-01 NOTE — NURSING NOTE
"Chief Complaint   Patient presents with     RECHECK     follow up with lillie WHITAKERimdeana cma       Initial BP (!) 171/98  Pulse 106  Temp 97.8  F (36.6  C) (Oral)  Ht 1.753 m (5' 9\")  Wt 86.7 kg (191 lb 1.6 oz)  BMI 28.22 kg/m2 Estimated body mass index is 28.22 kg/(m^2) as calculated from the following:    Height as of this encounter: 1.753 m (5' 9\").    Weight as of this encounter: 86.7 kg (191 lb 1.6 oz).  Medication Reconciliation: complete    "

## 2018-02-01 NOTE — NURSING NOTE
John Barclay      1.  Has the patient received the information for the influenza vaccine? YES    2.  Does the patient have any of the following contraindications?     Allergy to eggs? No     Allergic reaction to previous influenza vaccines? No     Any other problems to previous influenza vaccines? No     Paralyzed by Guillain-Stockton syndrome? No     Currently pregnant? NO     Current moderate or severe illness? No     Allergy to contact lens solution? No    3.  The vaccine has been administered in the usual fashion and the patient was instructed to wait 20 minutes before leaving the building in the event of an allergic reaction: YES    Vaccination given by arlyn couch.  Recorded by Lisa Azul

## 2018-02-01 NOTE — LETTER
2/1/2018      RE: John Barclay  [address]  [place] [facility] [id number]       [facility] - HIV Progress Note  Dr. Paty Whitaker, [facility], [facility], [address] [place] [address], Sixth Floor, [address]  [place] [address]  Patient:  John Barclay, Date of birth 1965, Medical record number [id number]  Date of Visit:  02/01/2018         Assessment and Recommendations:     Human immunodeficiency virus (HIV) disease (H)  Continue Triumeq.  Mr. Barclay would prefer not to have labs drawn today.  However, I will put in orders so that he can get these done in the next few weeks.      Depression  Mr. Barclay has undergone major personal changes in the last several years and he struggles with addiction.  We had an extended conversation today about how he is coping with this.  He has a mental health provider that he has been meaning to re-engage with.  I offered a referral today to a new provider if he needs it. He was reluctant to talk about it, so I was not able to screen him with the PHQ-9, but he almost certainly has severe depression.  He denies suicidal ideation.     Recurrent Staphylococcal Abscesses  We discussed decolonization today.  I told him that it often fails, but if he is interested we could try it.  He is going to consider it, but is not currently interested.     Preventative health care  Cardiovascular Frankie -               Lipids - CHOL 122 2/17/2016, LDL 72 2/17/2016              Blood Pressure - BP (!) 171/98 today. Previously has been normal.  He is not having symptoms. We will continue to monitor  Cancer Screening              Colon - Due for routine colonoscopy. We discussed this again today.               Anal - Discussed today. He wants to see if he can get this done with his colonoscopy  Immunizations              Hepatitis A - Due for third in series. Will administer at next visit.                Hepatitis B - Due for third in series. Will administer at next visit.               Influenza - Up to date              Pneumovax/Prevnar - PCV13 -2/16, PSV23              Tdap - 08/18/2016  Bone Health - Dexa screen normal 2016  Renal Health - Creatinine normal, UA with no proteinuria in 5/2016    Sexually Transmitted Infection Risk and Screening              Gonorrhea and Chlamydia - Declined    I spent more than 25 minutes today in face to face time managing the care of John Barclay.  Over 50% of my time on the unit was spent counseling the patient and/or coordinating care regarding services listed in this note.    Paty Whitaker MD  Division of Infectious Diseases and International Medicine  [phone]         History of Infectious Disease Illness:   Mr. Barclay is a 53 year old man who presents for routine follow up.  Mr. Barclay has really struggled with depression and addition since his HIV diagnosis.  He is very tearful today.  It is his birthday and he is recently officially  from his wife.  He continues to use alcohol and methamphetamine.  In the last six months he has had an admission with [facility] for suicidal ideation and he has no-showed for several appointments with me. He continues to take his ARVs.  He reports that that is going well.  He has some missed doses.  He has been having recurrent skin abscesses.  Both at the site of his injection sites but also places where he has not injected.  No fevers or chills.  No sweats.  No diarrhea.  No nausea or vomiting.         HIV History:   Date of Diagnosis: 2/2016  Approximated time of transmission: unknown  CD4 [place]: 225  Viral Load at Diagnosis: 543656  Opportunistic Infections: none  CMV Status: 2/25/16 positive  Toxo Status: 2/25/16 negative  HLA  Status: Negative  Tuberculosis Screening: negative  Historical use of ARVs: Truvada, Dolutegravir  Historical Resistance Mutations: None available        Past Medical and  Surgical History:     Past Medical History:   Diagnosis Date     Allergic rhinitis due to other allergen     mostly fall - hay fever      Asymptomatic human immunodeficiency virus (HIV) infection status (H)      Past Surgical History:   Procedure Laterality Date     NO HISTORY OF SURGERY           Family History:     Family History   Problem Relation Age of Onset     Arthritis Mother      Cancer - colorectal Father      Dx , has colostomy, no return of cancer, 69 yoa     DIABETES Father      Family History Negative Brother      Family History Negative Brother      Family History Negative Brother      Family History Negative Brother      Family History Negative Brother      Family History Negative Brother      Family History Negative Brother      Family History Negative Sister      Family History Negative Sister      Breast Cancer Sister       at age 45 of breast cancer.           Social History:     Social History   Substance Use Topics     Smoking status: Current Every Day Smoker     Packs/day: 0.50     Types: Cigarettes     Last attempt to quit: 1994     Smokeless tobacco: Never Used     Alcohol use No     Social History     Social History Narrative    , 3 children    Employed as a residential             Review of Systems:   CONSTITUTIONAL: negative fevers or chills   EYES: negative for icterus, no vision changes   ENT: negative for hearing loss, tinnitus, sore throat   RESPIRATORY: negative for cough, sputum or dyspnea   CARDIOVASCULAR: negative for chest pain, palpitations   GASTROINTESTINAL: negative for nausea, vomiting, diarrhea or constipation   GENITOURINARY: negative for dysuria or polyuria  HEME: No easy bruising   INTEGUMENT: negative for rash or pruritus, Recurrent abscesses.  None actively  NEURO: Negative for headache         Current Medications:     Current Outpatient Prescriptions   Medication Sig Dispense Refill     TRIUMEQ 600- MG per tablet TAKE ONE TABLET BY  "MOUTH EVERY DAY 30 tablet 3            Immunization History:     Immunization History   Administered Date(s) Administered     Influenza Vaccine IM 3yrs+ 4 Valent IIV4 02/17/2016     Meningococcal (Menactra ) 01/26/2017     Pneumo Conj 13-V (2010&after) 02/17/2016     Pneumococcal 23 valent 05/19/2016     TDAP Vaccine (Boostrix) 08/18/2016     Twinrix A/B 08/18/2016, 01/26/2017            Allergies:     No Known Allergies         Physical Exam:   Vital signs:  BP (!) 171/98  Pulse 106  Temp 97.8  F (36.6  C) (Oral)  Ht 1.753 m (5' 9\")  Wt 86.7 kg (191 lb 1.6 oz)  BMI 28.22 kg/m2    Physical Examination:  GENERAL:  well-developed, well-nourished, seated in no acute distress.  HEENT:  Head is normocephalic, atraumatic   OP: Clear oropharynx, no lesions, no thrush  EYES:  Eyes have anicteric sclerae without conjunctival injection   SKIN:  No rashes, multiple scars at injection sites.   RESP: BCTA No W/R/R  ABD: Soft, NTND  NEUROLOGIC:  Grossly nonfocal. Active x4 extremities  PSYCH: Oriented x3, + depressed mood. Denies suicidal ideation.         Laboratory Data:     CD4 Count  Absolute CD4   Date Value Ref Range Status   01/26/2017 717 441 - 2156 cells/uL Final     CD4 [place] T   Date Value Ref Range Status   01/26/2017 23 (L) 28 - 63 % Final     CD4:CD8 Ratio   Date Value Ref Range Status   01/26/2017 0.48 (L) 1.40 - 2.60 Final       HIV-1 RNA Quantitative:  HIV-1 RNA Quant Result   Date Value Ref Range Status   01/26/2017 (A) HIVND [Copies]/mL Final    <20  HIV-1 RNA Detected, less than 20 HIV-1 RNA copies/mL   The RADHA AmpliPrep/RADHA TaqMan HIV-1 test is an FDA-approved in vitro nucleic   acid amplification test for the quantitation of HIV-1 RNA in human plasma (EDTA   plasma) using the RADHA AmpliPrep instrument for automated viral nucleic acid   extraction and the RADHA TaqMan Analyzer or RADHA TaqMan for automated Real   Time PCR amplification and detection of the viral nucleic acid target.   Titer results " are reported in copies/ml. This assay is intended for use in   conjunction with clinical presentation and other laboratory markers of disease   prognosis and for use as an aid in assessing viral response to antiretroviral   treatment as measured by changes in plasma HIV-1 RNA levels. This test should   not be used as a donor screening test to confirm the presence of HIV-1   infection.         Metabolic Studies       Sodium   Date Value Ref Range Status   01/26/2017 140 133 - 144 mmol/L Final   08/18/2016 140 133 - 144 mmol/L Final     Potassium   Date Value Ref Range Status   01/26/2017 3.8 3.4 - 5.3 mmol/L Final   08/18/2016 3.6 3.4 - 5.3 mmol/L Final     Chloride   Date Value Ref Range Status   01/26/2017 104 94 - 109 mmol/L Final   08/18/2016 104 94 - 109 mmol/L Final     Carbon Dioxide   Date Value Ref Range Status   01/26/2017 27 20 - 32 mmol/L Final   08/18/2016 29 20 - 32 mmol/L Final     Anion Gap   Date Value Ref Range Status   01/26/2017 8 3 - 14 mmol/L Final   08/18/2016 8 3 - 14 mmol/L Final     Urea Nitrogen   Date Value Ref Range Status   01/26/2017 14 7 - 30 mg/dL Final   08/18/2016 12 7 - 30 mg/dL Final     Creatinine   Date Value Ref Range Status   01/26/2017 0.98 0.66 - 1.25 mg/dL Final   08/18/2016 0.97 0.66 - 1.25 mg/dL Final     GFR Estimate   Date Value Ref Range Status   01/26/2017 81 >60 mL/min/1.7m2 Final     Comment:     Non  GFR Calc   08/18/2016 81 >60 mL/min/1.7m2 Final     Comment:     Non  GFR Calc     Glucose   Date Value Ref Range Status   01/26/2017 93 70 - 99 mg/dL Final   08/18/2016 79 70 - 99 mg/dL Final     Hemoglobin A1C   Date Value Ref Range Status   02/16/2016 5.0 4.3 - 6.0 % Final     Calcium   Date Value Ref Range Status   01/26/2017 8.9 8.5 - 10.1 mg/dL Final   08/18/2016 9.0 8.5 - 10.1 mg/dL Final       Hepatic Studies    Bilirubin Total   Date Value Ref Range Status   01/26/2017 0.6 0.2 - 1.3 mg/dL Final   08/18/2016 0.7 0.2 - 1.3 mg/dL  Final     Alkaline Phosphatase   Date Value Ref Range Status   01/26/2017 54 40 - 150 U/L Final   08/18/2016 79 40 - 150 U/L Final     Albumin   Date Value Ref Range Status   01/26/2017 4.5 3.4 - 5.0 g/dL Final   08/18/2016 4.1 3.4 - 5.0 g/dL Final     AST   Date Value Ref Range Status   01/26/2017 22 0 - 45 U/L Final   08/18/2016 24 0 - 45 U/L Final     ALT   Date Value Ref Range Status   01/26/2017 26 0 - 70 U/L Final   08/18/2016 25 0 - 70 U/L Final       Hematology Studies      WBC   Date Value Ref Range Status   01/26/2017 7.3 4.0 - 11.0 10e9/L Final   08/18/2016 5.2 4.0 - 11.0 10e9/L Final     Absolute Neutrophil   Date Value Ref Range Status   02/14/2016 3.2 1.6 - 8.3 10e9/L Final     Absolute Lymphocytes   Date Value Ref Range Status   02/14/2016 1.1 0.8 - 5.3 10e9/L Final     Absolute Monocytes   Date Value Ref Range Status   02/14/2016 0.2 0.0 - 1.3 10e9/L Final     Absolute Eosinophils   Date Value Ref Range Status   02/14/2016 0.1 0.0 - 0.7 10e9/L Final     Hemoglobin   Date Value Ref Range Status   01/26/2017 16.5 13.3 - 17.7 g/dL Final   08/18/2016 15.7 13.3 - 17.7 g/dL Final     Hematocrit   Date Value Ref Range Status   01/26/2017 46.4 40.0 - 53.0 % Final   08/18/2016 44.9 40.0 - 53.0 % Final     Platelet Count   Date Value Ref Range Status   01/26/2017 282 150 - 450 10e9/L Final   08/18/2016 245 150 - 450 10e9/L Final       Hepatitis B Testing     Recent Labs   Lab Test  02/16/16   0833   HE[facility]ANG  Nonreactive     Hepatitis C Testing     Hepatitis C Antibody   Date Value Ref Range Status   02/14/2016  NR Final    Nonreactive   Assay performance characteristics have not been established for newborns,   infants, and children       Imaging:  No results found for this or any previous visit (from the past 744 hour(s)).        Paty Whitaker MD

## 2018-02-01 NOTE — MR AVS SNAPSHOT
After Visit Summary   2/1/2018    John Barclay    MRN: [id number]           Patient Information     Date Of Birth          1965        Visit Information        Provider Department      2/1/2018 9:30 AM Paty Whitaker MD [facility] Infectious Diseases        Today's Diagnoses     Preventative health care    -  1    Human immunodeficiency virus (HIV) disease (H)        Syphilis in male        Severe single current episode of major depressive disorder, without psychotic features (H)           Follow-ups after your visit        Who to contact     If you have questions or need follow up information about today's clinic visit or your schedule please contact [facility] AND INFECTIOUS DISEASES directly at [phone].  Normal or non-critical lab and imaging results will be communicated to you by [employer], letter or phone within 4 business days after the clinic has received the results. If you do not hear from us within 7 days, please contact the clinic through [employer] or phone. If you have a critical or abnormal lab result, we will notify you by phone as soon as possible.  Submit refill requests through [employer] or call your pharmacy and they will forward the refill request to us. Please allow 3 business days for your refill to be completed.          Additional Information About Your Visit        MyChart Information     [employer] gives you secure access to your electronic health record. If you see a primary care provider, you can also send messages to your care team and make appointments. If you have questions, please call your primary care clinic.  If you do not have a primary care provider, please call [phone] and they will assist you.        Care EveryWhere ID     This is your Care EveryWhere ID. This could be used by other organizations to access your [place] medical records  [id number]        Your Vitals Were     Pulse Temperature Height BMI  "(Body Mass Index)          106 97.8  F (36.6  C) (Oral) 1.753 m (5' 9\") 28.22 kg/m2         Blood Pressure from Last 3 Encounters:   02/01/18 (!) 171/98   09/26/17 135/85   08/27/17 (!) 145/99    Weight from Last 3 Encounters:   02/01/18 86.7 kg (191 lb 1.6 oz)   08/27/17 77.1 kg (170 lb)   08/02/17 78.5 kg (173 lb)               Primary Care Provider Office Phone # Fax #    Eddie Rodriguez [phone] [address] [facility] [address]  [facility] [address]        Equal Access to Services     ALEXYS SHEPHERD : Robbie maharajo Somaranda, waaxda luqadaha, qaybta kaalmada adeegyada, angel nicolas . So [facility] [phone].    ATENCIÓN: Si habla español, tiene a carpio disposición servicios gratuitos de asistencia lingüística. [facility] [phone].    We comply with applicable federal civil rights laws and [place] laws. We do not discriminate on the basis of race, color, national origin, age, disability, sex, sexual orientation, or gender identity.            Thank you!     Thank you for choosing [facility] AND INFECTIOUS DISEASES  for your care. Our goal is always to provide you with excellent care. Hearing back from our patients is one way we can continue to improve our services. Please take a few minutes to complete the written survey that you may receive in the mail after your visit with us. Thank you!             Your Updated Medication List - Protect others around you: Learn how to safely use, store and throw away your medicines at www.disposemymeds.org.          This list is accurate as of 2/1/18 11:59 PM.  Always use your most recent med list.                   Brand Name Dispense Instructions for use Diagnosis    TRIUMEQ 600- MG per tablet   Generic drug:  abacavir-dolutegravir-LamiVUDine     30 tablet    TAKE ONE TABLET BY MOUTH EVERY DAY    Human immunodeficiency virus (HIV) disease         "

## 2018-02-01 NOTE — PROGRESS NOTES
[facility] - HIV Progress Note  Dr. Paty Whitaker, [facility], [facility], [address] [place] [address], Sixth Floor, [address]  [place] [address]  Patient:  John Barclay, Date of birth 1965, Medical record number [id number]  Date of Visit:  02/01/2018         Assessment and Recommendations:     Human immunodeficiency virus (HIV) disease (H)  Continue Triumeq.  Mr. Barclay would prefer not to have labs drawn today.  However, I will put in orders so that he can get these done in the next few weeks.      Depression  Mr. Barclay has undergone major personal changes in the last several years and he struggles with addiction.  We had an extended conversation today about how he is coping with this.  He has a mental health provider that he has been meaning to re-engage with.  I offered a referral today to a new provider if he needs it. He was reluctant to talk about it, so I was not able to screen him with the PHQ-9, but he almost certainly has severe depression.  He denies suicidal ideation.     Recurrent Staphylococcal Abscesses  We discussed decolonization today.  I told him that it often fails, but if he is interested we could try it.  He is going to consider it, but is not currently interested.     Preventative health care  Cardiovascular Frankie -               Lipids - CHOL 122 2/17/2016, LDL 72 2/17/2016              Blood Pressure - BP (!) 171/98 today. Previously has been normal.  He is not having symptoms. We will continue to monitor  Cancer Screening              Colon - Due for routine colonoscopy. We discussed this again today.               Anal - Discussed today. He wants to see if he can get this done with his colonoscopy  Immunizations              Hepatitis A - Due for third in series. Will administer at next visit.               Hepatitis B - Due for third in series. Will administer at next visit.                Influenza - Up to date              Pneumovax/Prevnar - PCV13 -2/16, PSV23              Tdap - 08/18/2016  Bone Health - Dexa screen normal 2016  Renal Health - Creatinine normal, UA with no proteinuria in 5/2016    Sexually Transmitted Infection Risk and Screening              Gonorrhea and Chlamydia - Declined    I spent more than 25 minutes today in face to face time managing the care of John Barclay.  Over 50% of my time on the unit was spent counseling the patient and/or coordinating care regarding services listed in this note.    Paty Whitaker MD  Division of Infectious Diseases and International Medicine  [phone]         History of Infectious Disease Illness:   Mr. Barclay is a 53 year old man who presents for routine follow up.  Mr. Barclay has really struggled with depression and addition since his HIV diagnosis.  He is very tearful today.  It is his birthday and he is recently officially  from his wife.  He continues to use alcohol and methamphetamine.  In the last six months he has had an admission with [facility] for suicidal ideation and he has no-showed for several appointments with me. He continues to take his ARVs.  He reports that that is going well.  He has some missed doses.  He has been having recurrent skin abscesses.  Both at the site of his injection sites but also places where he has not injected.  No fevers or chills.  No sweats.  No diarrhea.  No nausea or vomiting.         HIV History:   Date of Diagnosis: 2/2016  Approximated time of transmission: unknown  CD4 [place]: 225  Viral Load at Diagnosis: 485572  Opportunistic Infections: none  CMV Status: 2/25/16 positive  Toxo Status: 2/25/16 negative  HLA  Status: Negative  Tuberculosis Screening: negative  Historical use of ARVs: Truvada, Dolutegravir  Historical Resistance Mutations: None available        Past Medical and Surgical History:     Past Medical History:   Diagnosis Date     Allergic rhinitis due to  other allergen     mostly fall - hay fever      Asymptomatic human immunodeficiency virus (HIV) infection status (H)      Past Surgical History:   Procedure Laterality Date     NO HISTORY OF SURGERY           Family History:     Family History   Problem Relation Age of Onset     Arthritis Mother      Cancer - colorectal Father      Dx , has colostomy, no return of cancer, 69 yoa     DIABETES Father      Family History Negative Brother      Family History Negative Brother      Family History Negative Brother      Family History Negative Brother      Family History Negative Brother      Family History Negative Brother      Family History Negative Brother      Family History Negative Sister      Family History Negative Sister      Breast Cancer Sister       at age 45 of breast cancer.           Social History:     Social History   Substance Use Topics     Smoking status: Current Every Day Smoker     Packs/day: 0.50     Types: Cigarettes     Last attempt to quit: 1994     Smokeless tobacco: Never Used     Alcohol use No     Social History     Social History Narrative    , 3 children    Employed as a residential             Review of Systems:   CONSTITUTIONAL: negative fevers or chills   EYES: negative for icterus, no vision changes   ENT: negative for hearing loss, tinnitus, sore throat   RESPIRATORY: negative for cough, sputum or dyspnea   CARDIOVASCULAR: negative for chest pain, palpitations   GASTROINTESTINAL: negative for nausea, vomiting, diarrhea or constipation   GENITOURINARY: negative for dysuria or polyuria  HEME: No easy bruising   INTEGUMENT: negative for rash or pruritus, Recurrent abscesses.  None actively  NEURO: Negative for headache         Current Medications:     Current Outpatient Prescriptions   Medication Sig Dispense Refill     TRIUMEQ 600- MG per tablet TAKE ONE TABLET BY MOUTH EVERY DAY 30 tablet 3            Immunization History:     Immunization History  "  Administered Date(s) Administered     Influenza Vaccine IM 3yrs+ 4 Valent IIV4 02/17/2016     Meningococcal (Menactra ) 01/26/2017     Pneumo Conj 13-V (2010&after) 02/17/2016     Pneumococcal 23 valent 05/19/2016     TDAP Vaccine (Boostrix) 08/18/2016     Twinrix A/B 08/18/2016, 01/26/2017            Allergies:     No Known Allergies         Physical Exam:   Vital signs:  BP (!) 171/98  Pulse 106  Temp 97.8  F (36.6  C) (Oral)  Ht 1.753 m (5' 9\")  Wt 86.7 kg (191 lb 1.6 oz)  BMI 28.22 kg/m2    Physical Examination:  GENERAL:  well-developed, well-nourished, seated in no acute distress.  HEENT:  Head is normocephalic, atraumatic   OP: Clear oropharynx, no lesions, no thrush  EYES:  Eyes have anicteric sclerae without conjunctival injection   SKIN:  No rashes, multiple scars at injection sites.   RESP: BCTA No W/R/R  ABD: Soft, NTND  NEUROLOGIC:  Grossly nonfocal. Active x4 extremities  PSYCH: Oriented x3, + depressed mood. Denies suicidal ideation.         Laboratory Data:     CD4 Count  Absolute CD4   Date Value Ref Range Status   01/26/2017 717 441 - 2156 cells/uL Final     CD4 [place] T   Date Value Ref Range Status   01/26/2017 23 (L) 28 - 63 % Final     CD4:CD8 Ratio   Date Value Ref Range Status   01/26/2017 0.48 (L) 1.40 - 2.60 Final       HIV-1 RNA Quantitative:  HIV-1 RNA Quant Result   Date Value Ref Range Status   01/26/2017 (A) HIVND [Copies]/mL Final    <20  HIV-1 RNA Detected, less than 20 HIV-1 RNA copies/mL   The RADHA AmpliPrep/RADHA TaqMan HIV-1 test is an FDA-approved in vitro nucleic   acid amplification test for the quantitation of HIV-1 RNA in human plasma (EDTA   plasma) using the RADHA AmpliPrep instrument for automated viral nucleic acid   extraction and the RADHA TaqMan Analyzer or RADHA TaqMan for automated Real   Time PCR amplification and detection of the viral nucleic acid target.   Titer results are reported in copies/ml. This assay is intended for use in   conjunction with " clinical presentation and other laboratory markers of disease   prognosis and for use as an aid in assessing viral response to antiretroviral   treatment as measured by changes in plasma HIV-1 RNA levels. This test should   not be used as a donor screening test to confirm the presence of HIV-1   infection.         Metabolic Studies       Sodium   Date Value Ref Range Status   01/26/2017 140 133 - 144 mmol/L Final   08/18/2016 140 133 - 144 mmol/L Final     Potassium   Date Value Ref Range Status   01/26/2017 3.8 3.4 - 5.3 mmol/L Final   08/18/2016 3.6 3.4 - 5.3 mmol/L Final     Chloride   Date Value Ref Range Status   01/26/2017 104 94 - 109 mmol/L Final   08/18/2016 104 94 - 109 mmol/L Final     Carbon Dioxide   Date Value Ref Range Status   01/26/2017 27 20 - 32 mmol/L Final   08/18/2016 29 20 - 32 mmol/L Final     Anion Gap   Date Value Ref Range Status   01/26/2017 8 3 - 14 mmol/L Final   08/18/2016 8 3 - 14 mmol/L Final     Urea Nitrogen   Date Value Ref Range Status   01/26/2017 14 7 - 30 mg/dL Final   08/18/2016 12 7 - 30 mg/dL Final     Creatinine   Date Value Ref Range Status   01/26/2017 0.98 0.66 - 1.25 mg/dL Final   08/18/2016 0.97 0.66 - 1.25 mg/dL Final     GFR Estimate   Date Value Ref Range Status   01/26/2017 81 >60 mL/min/1.7m2 Final     Comment:     Non  GFR Calc   08/18/2016 81 >60 mL/min/1.7m2 Final     Comment:     Non  GFR Calc     Glucose   Date Value Ref Range Status   01/26/2017 93 70 - 99 mg/dL Final   08/18/2016 79 70 - 99 mg/dL Final     Hemoglobin A1C   Date Value Ref Range Status   02/16/2016 5.0 4.3 - 6.0 % Final     Calcium   Date Value Ref Range Status   01/26/2017 8.9 8.5 - 10.1 mg/dL Final   08/18/2016 9.0 8.5 - 10.1 mg/dL Final       Hepatic Studies    Bilirubin Total   Date Value Ref Range Status   01/26/2017 0.6 0.2 - 1.3 mg/dL Final   08/18/2016 0.7 0.2 - 1.3 mg/dL Final     Alkaline Phosphatase   Date Value Ref Range Status   01/26/2017 54 40 -  150 U/L Final   08/18/2016 79 40 - 150 U/L Final     Albumin   Date Value Ref Range Status   01/26/2017 4.5 3.4 - 5.0 g/dL Final   08/18/2016 4.1 3.4 - 5.0 g/dL Final     AST   Date Value Ref Range Status   01/26/2017 22 0 - 45 U/L Final   08/18/2016 24 0 - 45 U/L Final     ALT   Date Value Ref Range Status   01/26/2017 26 0 - 70 U/L Final   08/18/2016 25 0 - 70 U/L Final       Hematology Studies      WBC   Date Value Ref Range Status   01/26/2017 7.3 4.0 - 11.0 10e9/L Final   08/18/2016 5.2 4.0 - 11.0 10e9/L Final     Absolute Neutrophil   Date Value Ref Range Status   02/14/2016 3.2 1.6 - 8.3 10e9/L Final     Absolute Lymphocytes   Date Value Ref Range Status   02/14/2016 1.1 0.8 - 5.3 10e9/L Final     Absolute Monocytes   Date Value Ref Range Status   02/14/2016 0.2 0.0 - 1.3 10e9/L Final     Absolute Eosinophils   Date Value Ref Range Status   02/14/2016 0.1 0.0 - 0.7 10e9/L Final     Hemoglobin   Date Value Ref Range Status   01/26/2017 16.5 13.3 - 17.7 g/dL Final   08/18/2016 15.7 13.3 - 17.7 g/dL Final     Hematocrit   Date Value Ref Range Status   01/26/2017 46.4 40.0 - 53.0 % Final   08/18/2016 44.9 40.0 - 53.0 % Final     Platelet Count   Date Value Ref Range Status   01/26/2017 282 150 - 450 10e9/L Final   08/18/2016 245 150 - 450 10e9/L Final       Hepatitis B Testing     Recent Labs   Lab Test  02/16/16   0833   HEPBANG  Nonreactive     Hepatitis C Testing     Hepatitis C Antibody   Date Value Ref Range Status   02/14/2016  NR Final    Nonreactive   Assay performance characteristics have not been established for newborns,   infants, and children       Imaging:  No results found for this or any previous visit (from the past 744 hour(s)).

## 2018-02-02 ASSESSMENT — PATIENT HEALTH QUESTIONNAIRE - PHQ9: SUM OF ALL RESPONSES TO PHQ QUESTIONS 1-9: 8

## 2018-02-06 ENCOUNTER — TELEPHONE (OUTPATIENT)
Dept: PHARMACY | Facility: CLINIC | Age: 53
End: 2018-02-06

## 2018-02-06 NOTE — TELEPHONE ENCOUNTER
Attempted to contact the patient for refill reminder call,  left message on voicemail    Follow up date:  2/8/18    Gonzalez

## 2018-02-20 DIAGNOSIS — B20 HUMAN IMMUNODEFICIENCY VIRUS (HIV) DISEASE (H): ICD-10-CM

## 2018-02-20 RX ORDER — ABACAVIR SULFATE, DOLUTEGRAVIR SODIUM, LAMIVUDINE 600; 50; 300 MG/1; MG/1; MG/1
TABLET, FILM COATED ORAL
Qty: 90 TABLET | Refills: 3 | Status: ON HOLD | OUTPATIENT
Start: 2018-02-20 | End: 2019-08-06

## 2018-03-01 ENCOUNTER — TRANSFERRED RECORDS (OUTPATIENT)
Dept: HEALTH INFORMATION MANAGEMENT | Facility: CLINIC | Age: 53
End: 2018-03-01

## 2018-03-02 ENCOUNTER — TRANSFERRED RECORDS (OUTPATIENT)
Dept: HEALTH INFORMATION MANAGEMENT | Facility: CLINIC | Age: 53
End: 2018-03-02

## 2018-03-16 ENCOUNTER — TELEPHONE (OUTPATIENT)
Dept: PHARMACY | Facility: CLINIC | Age: 53
End: 2018-03-16

## 2018-03-16 NOTE — TELEPHONE ENCOUNTER
Attempted to contact the patient to for refill reminder call,  left message on voicemail    LILIA Carroll Pharmacist.   [phone]

## 2018-04-05 ENCOUNTER — TELEPHONE (OUTPATIENT)
Dept: PHARMACY | Facility: CLINIC | Age: 53
End: 2018-04-05

## 2018-04-05 NOTE — TELEPHONE ENCOUNTER
Called patient for refill reminder.    Patient will   1 prescriptions on 04/05/18. Took last dose today.     Follow up: 04/27/18    Abigail Rvias, [facility]  [phone]

## 2018-05-02 ENCOUNTER — TELEPHONE (OUTPATIENT)
Dept: PHARMACY | Facility: CLINIC | Age: 53
End: 2018-05-02

## 2018-05-07 NOTE — TELEPHONE ENCOUNTER
Called patient for refill reminder.    Will mail 1 prescriptions address has been verified.     Not a good candidate to luana Sp[place]    Follow-up Date: 06/01/18    Abigail Rivas [facility]  [phone]

## 2018-05-31 ENCOUNTER — TELEPHONE (OUTPATIENT)
Dept: PHARMACY | Facility: CLINIC | Age: 53
End: 2018-05-31

## 2018-05-31 NOTE — TELEPHONE ENCOUNTER
Attempted to contact the patient to for refill reminder call,  left message on voicemail    Follow-up Date: 06/06

## 2018-06-06 ENCOUNTER — TELEPHONE (OUTPATIENT)
Dept: PHARMACY | Facility: CLINIC | Age: 53
End: 2018-06-06

## 2018-06-06 NOTE — TELEPHONE ENCOUNTER
Attempted to contact the patient to for refill reminder call,  left message on voicemail    Follow-up Date: 6/11/18    Sandra Marie [facility]  [phone]

## 2018-07-10 NOTE — TELEPHONE ENCOUNTER
Called patient for refill reminder.    Patient will   1 prescriptions on 06/23/18  Pt is not a currently a candidate for [place] to [place]    Follow-up Date: 07/18/18    Abigail Rivas [facility]  [phone]

## 2018-07-17 ENCOUNTER — TELEPHONE (OUTPATIENT)
Dept: PHARMACY | Facility: CLINIC | Age: 53
End: 2018-07-17

## 2018-07-17 NOTE — TELEPHONE ENCOUNTER
Attempted to contact the patient to for refill reminder call,  left message on voicemail    Follow-up Date: 07/23

## 2018-08-08 ENCOUNTER — TELEPHONE (OUTPATIENT)
Dept: PHARMACY | Facility: CLINIC | Age: 53
End: 2018-08-08

## 2018-08-08 NOTE — TELEPHONE ENCOUNTER
Called patient and left voice message regarding medication refills.     Follow-up: 8/10/2018    Sandra Macias RPh Intern

## 2018-08-15 ENCOUNTER — TELEPHONE (OUTPATIENT)
Dept: PHARMACY | Facility: CLINIC | Age: 53
End: 2018-08-15

## 2018-08-15 NOTE — TELEPHONE ENCOUNTER
Sent a text message to the patient as a refill reminder.    Follow-up Date: 08/17/2018 (4th Attempt)    Fly Rodriguez, 2nd Year Pharmacy Intern  [facility]

## 2019-05-30 ENCOUNTER — OFFICE VISIT (OUTPATIENT)
Dept: FAMILY MEDICINE | Facility: CLINIC | Age: 54
End: 2019-05-30
Payer: MEDICAID

## 2019-05-30 VITALS
BODY MASS INDEX: 31.25 KG/M2 | HEART RATE: 96 BPM | TEMPERATURE: 97.3 F | DIASTOLIC BLOOD PRESSURE: 83 MMHG | SYSTOLIC BLOOD PRESSURE: 135 MMHG | OXYGEN SATURATION: 96 % | HEIGHT: 69 IN | WEIGHT: 211 LBS

## 2019-05-30 DIAGNOSIS — Z00.00 ROUTINE HISTORY AND PHYSICAL EXAMINATION OF ADULT: Primary | ICD-10-CM

## 2019-05-30 DIAGNOSIS — F15.11 METHAMPHETAMINE ABUSE IN REMISSION (H): ICD-10-CM

## 2019-05-30 PROCEDURE — 99396 PREV VISIT EST AGE 40-64: CPT | Performed by: INTERNAL MEDICINE

## 2019-05-30 RX ORDER — NALTREXONE HYDROCHLORIDE 50 MG/1
50 TABLET, FILM COATED ORAL DAILY
COMMUNITY
Start: 2017-10-10 | End: 2019-05-30

## 2019-05-30 RX ORDER — ESCITALOPRAM OXALATE 10 MG/1
10 TABLET ORAL DAILY
COMMUNITY
Start: 2017-10-10 | End: 2019-05-30

## 2019-05-30 ASSESSMENT — PATIENT HEALTH QUESTIONNAIRE - PHQ9: SUM OF ALL RESPONSES TO PHQ QUESTIONS 1-9: 2

## 2019-05-30 ASSESSMENT — MIFFLIN-ST. JEOR: SCORE: 1793.82

## 2019-05-30 NOTE — PROGRESS NOTES
SUBJECTIVE:   CC: John Barclay is an 54 year old male who presents for preventative health visit.     Healthy Habits:    Getting at least 3 servings of Calcium per day:  Yes    Bi-annual eye exam:  NO    Dental care twice a year:  NO    Sleep apnea or symptoms of sleep apnea:  None    Diet:  Regular (no restrictions)    Frequency of exercise:  None    Taking medications regularly:  No    Barriers to taking medications:  None    Medication side effects:  None    PHQ-2 Total Score:    Additional concerns today:  No          Today's PHQ-2 Score:   PHQ-2 (  [employer]) 2018   Q1: Little interest or pleasure in doing things 2   Q2: Feeling down, depressed or hopeless 2   PHQ-2 Score 4       Abuse: Current or Past(Physical, Sexual or Emotional)- No  Do you feel safe in your environment? Yes    Social History     Tobacco Use     Smoking status: Current Every Day Smoker     Packs/day: 0.50     Types: Cigarettes     Last attempt to quit: 1994     Years since quittin.4     Smokeless tobacco: Never Used   Substance Use Topics     Alcohol use: No     Alcohol/week: 0.0 oz     If you drink alcohol do you typically have >3 drinks per day or >7 drinks per week? No      Last PSA:   PSA   Date Value Ref Range Status   2016 0.44 0 - 4 ug/L Final       Reviewed orders with patient. Reviewed health maintenance and updated orders accordingly - Yes  Lab work is in process    Reviewed and updated as needed this visit by clinical staff  Allergies  Meds         Reviewed and updated as needed this visit by Provider        Past Medical History:   Diagnosis Date     Allergic rhinitis due to other allergen     mostly fall - hay fever      Asymptomatic human immunodeficiency virus (HIV) infection status (H)         Review of Systems  CONSTITUTIONAL: NEGATIVE for fever, chills, change in weight  INTEGUMENTARY/SKIN: NEGATIVE for worrisome rashes, moles or lesions  EYES: NEGATIVE for vision changes or irritation  ENT:  "NEGATIVE for ear, mouth and throat problems  RESP: NEGATIVE for significant cough or SOB  CV: NEGATIVE for chest pain, palpitations or peripheral edema  GI: NEGATIVE for nausea, abdominal pain, heartburn, or change in bowel habits   male: negative for dysuria, hematuria, decreased urinary stream, erectile dysfunction, urethral discharge  MUSCULOSKELETAL: NEGATIVE for significant arthralgias or myalgia  NEURO: NEGATIVE for weakness, dizziness or paresthesias  PSYCHIATRIC: NEGATIVE for changes in mood or affect  Behavioral: positive for history of meth dependence, currently in remission    OBJECTIVE:   /83 (BP Location: Left arm, Patient Position: Sitting, Cuff Size: Adult Large)   Pulse 96   Temp 97.3  F (36.3  C) (Oral)   Ht 1.763 m (5' 9.4\")   Wt 95.7 kg (211 lb)   SpO2 96%   BMI 30.80 kg/m      Physical Exam  GENERAL: alert and no distress  EYES: Eyes grossly normal to inspection, PERRL and conjunctivae and sclerae normal  HENT: ear canals and TM's normal, nose and mouth without ulcers or lesions  NECK: no adenopathy, no asymmetry, masses, or scars and thyroid normal to palpation  RESP: lungs clear to auscultation - no rales, rhonchi or wheezes  CV: regular rate and rhythm, normal S1 S2, no S3 or S4, no murmur, click or rub, no peripheral edema and peripheral pulses strong  ABDOMEN: soft, nontender, no hepatosplenomegaly, no masses and bowel sounds normal  MS: no gross musculoskeletal defects noted, no edema  SKIN: no suspicious lesions or rashes  NEURO: Normal strength and tone, mentation intact and speech normal  PSYCH: mentation appears normal, affect normal/bright    Diagnostic Test Results:  Labs reviewed in Epic  Results for orders placed or performed during the hospital encounter of 09/26/17   POC US SOFT TISSUE    Impression    ED Bedside Limited Ultrasound  Body area scanned: R buttock  Performed by: Zeyad Preciado MD  Indication: swelling/pain, eval for fluid " collection  Findings/Interpretation: +small fluid collection consistent with superficial abscess       Wound Culture Aerobic Bacterial   Result Value Ref Range    Specimen Description Right Buttock Abscess     Special Requests       Specimen collected in eSwab transport (white cap)  This specimen was received on a swab. Results may not be optimal. For maximum sensitivity   of detection, submit tissue, fluid, or needle aspirate.      Culture Micro       Canceled, Test credited  Test reordered as correct code  REORDERED AS AN ABSCESS CULTURE     Abscess Culture Aerobic Bacterial   Result Value Ref Range    Specimen Description Right Buttock Abscess     Special Requests       Specimen collected in eSwab transport (white cap)  This specimen was received on a swab. Results may not be optimal. For maximum sensitivity   of detection, submit tissue, fluid, or needle aspirate.      Culture Micro (A)      Heavy growth  Methicillin resistant Staphylococcus aureus (MRSA)  This isolate DOES NOT demonstrate inducible clindamycin resistance in vitro. Clindamycin   is susceptible and could be used when indicated, however, erythromycin is resistant and   should not be used.         Susceptibility    Methicillin resistant staphylococcus aureus (mrsa) - PAYAM     CIPROFLOXACIN >=8 Resistant ug/mL     CLINDAMYCIN <=0.25 Sensitive ug/mL     ERYTHROMYCIN >=8 Resistant ug/mL     GENTAMICIN <=0.5 Sensitive ug/mL     LEVOFLOXACIN 4 Resistant ug/mL     OXACILLIN >=4 Resistant ug/mL     PENICILLIN >=0.5 Resistant ug/mL     TETRACYCLINE <=1 Sensitive ug/mL     Trimethoprim/Sulfa 4/76 Resistant ug/mL     VANCOMYCIN 1 Sensitive ug/mL     LINEZOLID 2 Sensitive ug/mL       ASSESSMENT/PLAN:   (Z00.00) Routine history and physical examination of adult  (primary encounter diagnosis)  (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  Plan: continue current treatment program going forward.  "Patient is advised to continue his meth abuse cessation efforts going forward.    COUNSELING:   Reviewed preventive health counseling, as reflected in patient instructions  Special attention given to:        Regular exercise       Healthy diet/nutrition    Estimated body mass index is 28.22 kg/m  as calculated from the following:    Height as of 2/1/18: 1.753 m (5' 9\").    Weight as of 2/1/18: 86.7 kg (191 lb 1.6 oz).     Weight management plan: Discussed healthy diet and exercise guidelines     reports that he has been smoking cigarettes.  He has been smoking about 0.50 packs per day. He has never used smokeless tobacco.      Counseling Resources:  ATP IV Guidelines  Pooled Cohorts Equation Calculator  FRAX Risk Assessment  ICSI Preventive Guidelines  Dietary Guidelines for Americans, 2010  USDA's MyPlate  ASA Prophylaxis  Lung CA Screening    Radha Lee MD  [facility]  "

## 2019-06-11 ENCOUNTER — DOCUMENTATION ONLY (OUTPATIENT)
Dept: CARE COORDINATION | Facility: CLINIC | Age: 54
End: 2019-06-11

## 2019-06-22 NOTE — TELEPHONE ENCOUNTER
RECORDS RECEIVED FROM: appt per pts residence. dx: B20 f/u, was a pt of Dr. Paty Whitaker   DATE RECEIVED: 06.28.2019   NOTES (Gather within 2 years) STATUS DETAILS   OFFICE NOTE from referring provider   N/A    OFFICE NOTE from other specialist Internal 02.01.2018   DISCHARGE SUMMARY from hospital N/A    DISCHARGE REPORT from the ER N/A    LABS (any labs) Internal  Care Everywhere    MEDICATION LIST Internal    IMAGING  (NEED IMAGES AND REPORTS)     Osteomyelitis: Foot imaging  N/A    Liver Abscess: Abdominal imaging N/A    Other (anything related to diagnoses N/A

## 2019-06-27 ENCOUNTER — TELEPHONE (OUTPATIENT)
Dept: INFECTIOUS DISEASES | Facility: CLINIC | Age: 54
End: 2019-06-27

## 2019-06-28 ENCOUNTER — PRE VISIT (OUTPATIENT)
Dept: INFECTIOUS DISEASES | Facility: CLINIC | Age: 54
End: 2019-06-28

## 2019-07-31 ENCOUNTER — HOSPITAL ENCOUNTER (EMERGENCY)
Facility: CLINIC | Age: 54
Discharge: HOME | End: 2019-07-31
Attending: FAMILY MEDICINE | Admitting: FAMILY MEDICINE
Payer: COMMERCIAL

## 2019-07-31 VITALS
HEART RATE: 100 BPM | RESPIRATION RATE: 16 BRPM | DIASTOLIC BLOOD PRESSURE: 95 MMHG | TEMPERATURE: 98.6 F | SYSTOLIC BLOOD PRESSURE: 158 MMHG | WEIGHT: 186.7 LBS | OXYGEN SATURATION: 97 % | BODY MASS INDEX: 27.25 KG/M2

## 2019-07-31 DIAGNOSIS — F15.10 METHAMPHETAMINE ABUSE (H): ICD-10-CM

## 2019-07-31 DIAGNOSIS — Z21 ASYMPTOMATIC HUMAN IMMUNODEFICIENCY VIRUS (HIV) INFECTION STATUS (H): ICD-10-CM

## 2019-07-31 DIAGNOSIS — F30.8 HYPOMANIC EPISODE (H): ICD-10-CM

## 2019-07-31 PROCEDURE — 90791 PSYCH DIAGNOSTIC EVALUATION: CPT

## 2019-07-31 PROCEDURE — 99283 EMERGENCY DEPT VISIT LOW MDM: CPT | Mod: Z6 | Performed by: FAMILY MEDICINE

## 2019-07-31 PROCEDURE — 99285 EMERGENCY DEPT VISIT HI MDM: CPT | Mod: 25 | Performed by: FAMILY MEDICINE

## 2019-07-31 RX ORDER — HYDROXYZINE PAMOATE 50 MG/1
50 CAPSULE ORAL 3 TIMES DAILY PRN
Qty: 15 CAPSULE | Refills: 0 | Status: ON HOLD | OUTPATIENT
Start: 2019-07-31 | End: 2019-08-06

## 2019-07-31 ASSESSMENT — ENCOUNTER SYMPTOMS
DECREASED CONCENTRATION: 1
CONFUSION: 1
HYPERACTIVE: 1

## 2019-07-31 NOTE — ED AVS SNAPSHOT
[facility], [place], Emergency Department  [address]  [facility] [address]  Phone:  [phone]  Fax:  [phone]                                    John Barclay   MRN: [id number]    Department:  [facility], Emergency Department   Date of Visit:  7/31/2019           After Visit Summary Signature Page    I have received my discharge instructions, and my questions have been answered. I have discussed any challenges I see with this plan with the nurse or doctor.    ..........................................................................................................................................  Patient/Patient Representative Signature      ..........................................................................................................................................  Patient Representative Print Name and Relationship to Patient    ..................................................               ................................................  Date                                   Time    ..........................................................................................................................................  Reviewed by Signature/Title    ...................................................              ..............................................  Date                                               Time          22EPIC Rev 08/18

## 2019-08-01 NOTE — ED PROVIDER NOTES
"    [facility] EMERGENCY DE[facility]MENT ([facility])  7/31/19    History     Chief Complaint   Patient presents with     Suicidal     patient denies but family states that he has told them he would be better off not being here; just got out of [facility] today after hospitalization for imani; family reports pt making statements about god, the devil      Medication Refill     Has been off his HIV meds for about one year and would like a refill     Addiction Problem     Last used meth Wednesday carmel; does IV and smoke     Homicidal     Talked about purchasing a gun in the last week and threatening family members with it     The history is provided by the patient, a relative and medical records.     John Barclay is a 54 year old male with a past medical history significant for HIV, depression and methamphetamine abuse who presents here to the Emergency Department due to imani. Patients sister reports that the patient stated that he, \"Found God\" on 7/26/2019 and had began acting bizarre. On 7/29/2019 patients sober house had sent him to [facility] due to imani, aggression and agitation.    Per chart review patient had been hospitalized at [facility] from 7/29/2019-7/31/2019 after he had been acting strangely at his sober living house. Sober living had said he was manic, aggressive and agitated.  They were concerned about methamphetamine use, however, his UA came back negative. Patient had become agitated and required restraints and IM medications. Patient had felt stabilized and was ready for discharge.    Patients sister had picked the patient up from [facility] earlier today. She notes that when she brought him home she had not believed he was stable and was still not making sense. She noted that he had been making threats of beating up others and getting a gun to shoot himself, but these statements were made awhile ago in the past. He recently got a job at [employer] but sister does not believe he jyotsna has this job. He is scheduled for a " therapy appointment in 2 days and sees his PCP in August. Patient notes not taking his HIV medications for over a year and would like to get started on these again. He notes to be 21 days sober from methamphetamine.    I have reviewed the Medications, Allergies, Past Medical and Surgical History, and Social History in the [facility] system.    Past Medical History:   Diagnosis Date     Allergic rhinitis due to other allergen     mostly fall - hay fever      Asymptomatic human immunodeficiency virus (HIV) infection status (H)        Past Surgical History:   Procedure Laterality Date     NO HISTORY OF SURGERY       ORTHOPEDIC SURGERY         Family History   Problem Relation Age of Onset     Arthritis Mother      Cancer - colorectal Father         Dx , has colostomy, no return of cancer, 69 yoa     Diabetes Father      Family History Negative Brother      Family History Negative Brother      Family History Negative Brother      Family History Negative Brother      Family History Negative Brother      Family History Negative Brother      Family History Negative Brother      Family History Negative Sister      Family History Negative Sister      Breast Cancer Sister          at age 45 of breast cancer.       Social History     Tobacco Use     Smoking status: Current Every Day Smoker     Packs/day: 0.50     Types: Cigarettes     Last attempt to quit: 1994     Years since quittin.6     Smokeless tobacco: Never Used   Substance Use Topics     Alcohol use: No     Alcohol/week: 0.0 oz       No current facility-administered medications for this encounter.      Current Outpatient Medications   Medication     abacavir-dolutegravir-LamiVUDine (TRIUMEQ) 600- MG per tablet     hydrOXYzine (VISTARIL) 50 MG capsule     TRIUMEQ 600- MG per tablet        Allergies   Allergen Reactions     No Known Allergies        Review of Systems   Psychiatric/Behavioral: Positive for behavioral problems, confusion and  decreased concentration. Negative for suicidal ideas. The patient is hyperactive.        Physical Exam   BP: (!) 140/95  Pulse: 90  Temp: 98.6  F (37  C)  Resp: 18  Weight: 84.7 kg (186 lb 11.2 oz)  SpO2: 97 %      Physical Exam   Constitutional: He is oriented to person, place, and time. No distress.   HENT:   Head: Atraumatic.   Mouth/Throat: Oropharynx is clear and moist.   Eyes: Pupils are equal, round, and reactive to light. No scleral icterus.   Cardiovascular: Normal heart sounds and intact distal pulses.   Pulmonary/Chest: Breath sounds normal. No respiratory distress.   Abdominal: Soft. Bowel sounds are normal. There is no tenderness.   Musculoskeletal: He exhibits no edema or tenderness.   Neurological: He is alert and oriented to person, place, and time. He exhibits normal muscle tone. Coordination normal.   Skin: Skin is warm. No rash noted. He is not diaphoretic.   Psychiatric: His mood appears anxious. He exhibits a depressed mood.       ED Course        Procedures    Critical Care time:  none         Assessments & Plan (with Medical Decision Making)       I have reviewed the nursing notes.    I have reviewed the findings, diagnosis, plan and need for follow up with the patient.  Patient will be discharged to adult shelter services with prescription of Vistaril for anxiety and difficulty with sleep.      Final diagnoses:   Hypomanic episode (H)   Asymptomatic human immunodeficiency virus (HIV) infection status (H)   Methamphetamine abuse (H)     Robert LEPE, am serving as a trained medical scribe to document services personally performed by Avelino Rausch MD, based on the provider's statements to me.   Avelino LEPE MD, was physically present and have reviewed and verified the accuracy of this note documented by Robert Cast.    7/31/2019   [facility] EMERGENCY DEPARTMENT     Avelino Rausch MD  08/03/19 [id number]

## 2019-08-01 NOTE — DISCHARGE INSTRUCTIONS
Discharge to adult shelter with HIV medications as well as Vistaril as needed follow-up with your outpatient therapist or return if any increased thoughts of harming self or others.

## 2019-08-02 ENCOUNTER — HOSPITAL ENCOUNTER (EMERGENCY)
Facility: CLINIC | Age: 54
Discharge: HOME | End: 2019-08-02
Attending: EMERGENCY MEDICINE | Admitting: EMERGENCY MEDICINE
Payer: COMMERCIAL

## 2019-08-02 VITALS
WEIGHT: 187 LBS | SYSTOLIC BLOOD PRESSURE: 138 MMHG | HEIGHT: 69 IN | DIASTOLIC BLOOD PRESSURE: 100 MMHG | OXYGEN SATURATION: 96 % | BODY MASS INDEX: 27.7 KG/M2 | TEMPERATURE: 97.5 F

## 2019-08-02 DIAGNOSIS — F43.0 ACUTE REACTION TO SITUATIONAL STRESS: ICD-10-CM

## 2019-08-02 PROCEDURE — 99282 EMERGENCY DEPT VISIT SF MDM: CPT

## 2019-08-02 ASSESSMENT — ENCOUNTER SYMPTOMS: NERVOUS/ANXIOUS: 1

## 2019-08-02 ASSESSMENT — MIFFLIN-ST. JEOR: SCORE: 1678.61

## 2019-08-02 NOTE — ED AVS SNAPSHOT
Emergency Department  [address] [place] [address]  Phone:  [phone]  Fax:  [phone]                                    John Barclay   MRN: [id number]    Department:   Emergency Department   Date of Visit:  8/2/2019           After Visit Summary Signature Page    I have received my discharge instructions, and my questions have been answered. I have discussed any challenges I see with this plan with the nurse or doctor.    ..........................................................................................................................................  Patient/Patient Representative Signature      ..........................................................................................................................................  Patient Representative Print Name and Relationship to Patient    ..................................................               ................................................  Date                                   Time    ..........................................................................................................................................  Reviewed by Signature/Title    ...................................................              ..............................................  Date                                               Time          22EPIC Rev 08/18

## 2019-08-03 ENCOUNTER — HOSPITAL ENCOUNTER (INPATIENT)
Facility: CLINIC | Age: 54
LOS: 5 days | Discharge: LEFT BEFORE DISCHARGE | End: 2019-08-09
Attending: FAMILY MEDICINE | Admitting: PSYCHIATRY & NEUROLOGY
Payer: COMMERCIAL

## 2019-08-03 DIAGNOSIS — Z59.00 HOMELESS: ICD-10-CM

## 2019-08-03 DIAGNOSIS — B20 HUMAN IMMUNODEFICIENCY VIRUS (HIV) DISEASE (H): ICD-10-CM

## 2019-08-03 DIAGNOSIS — F31.12 BIPOLAR AFFECTIVE DISORDER, CURRENTLY MANIC, MODERATE (H): ICD-10-CM

## 2019-08-03 LAB
ALBUMIN SERPL-MCNC: 3.8 G/DL (ref 3.4–5)
ALCOHOL BREATH TEST: 0 (ref 0–0.01)
ALP SERPL-CCNC: 59 U/L (ref 40–150)
ALT SERPL W P-5'-P-CCNC: 40 U/L (ref 0–70)
AMPHETAMINES UR QL SCN: NEGATIVE
ANION GAP SERPL CALCULATED.3IONS-SCNC: 7 MMOL/L (ref 3–14)
AST SERPL W P-5'-P-CCNC: 52 U/L (ref 0–45)
BARBITURATES UR QL: NEGATIVE
BASOPHILS # BLD AUTO: 0 10E9/L (ref 0–0.2)
BASOPHILS NFR BLD AUTO: 0.6 %
BENZODIAZ UR QL: NEGATIVE
BILIRUB SERPL-MCNC: 0.4 MG/DL (ref 0.2–1.3)
BUN SERPL-MCNC: 22 MG/DL (ref 7–30)
CALCIUM SERPL-MCNC: 8.1 MG/DL (ref 8.5–10.1)
CANNABINOIDS UR QL SCN: NEGATIVE
CHLORIDE SERPL-SCNC: 106 MMOL/L (ref 94–109)
CO2 SERPL-SCNC: 25 MMOL/L (ref 20–32)
COCAINE UR QL: NEGATIVE
CREAT SERPL-MCNC: 1.1 MG/DL (ref 0.66–1.25)
DIFFERENTIAL METHOD BLD: NORMAL
EOSINOPHIL # BLD AUTO: 0.2 10E9/L (ref 0–0.7)
EOSINOPHIL NFR BLD AUTO: 3.9 %
ERYTHROCYTE [DISTWIDTH] IN BLOOD BY AUTOMATED COUNT: 13 % (ref 10–15)
ETHANOL UR QL SCN: NEGATIVE
GFR SERPL CREATININE-BSD FRML MDRD: 75 ML/MIN/{1.73_M2}
GLUCOSE SERPL-MCNC: 117 MG/DL (ref 70–99)
HCT VFR BLD AUTO: 43.8 % (ref 40–53)
HGB BLD-MCNC: 15 G/DL (ref 13.3–17.7)
IMM GRANULOCYTES # BLD: 0 10E9/L (ref 0–0.4)
IMM GRANULOCYTES NFR BLD: 0.2 %
LYMPHOCYTES # BLD AUTO: 2.6 10E9/L (ref 0.8–5.3)
LYMPHOCYTES NFR BLD AUTO: 50.7 %
MCH RBC QN AUTO: 30.9 PG (ref 26.5–33)
MCHC RBC AUTO-ENTMCNC: 34.2 G/DL (ref 31.5–36.5)
MCV RBC AUTO: 90 FL (ref 78–100)
MONOCYTES # BLD AUTO: 0.3 10E9/L (ref 0–1.3)
MONOCYTES NFR BLD AUTO: 5.4 %
NEUTROPHILS # BLD AUTO: 2 10E9/L (ref 1.6–8.3)
NEUTROPHILS NFR BLD AUTO: 39.2 %
NRBC # BLD AUTO: 0 10*3/UL
NRBC BLD AUTO-RTO: 0 /100
OPIATES UR QL SCN: NEGATIVE
PLATELET # BLD AUTO: 194 10E9/L (ref 150–450)
POTASSIUM SERPL-SCNC: 3.6 MMOL/L (ref 3.4–5.3)
PROT SERPL-MCNC: 7.8 G/DL (ref 6.8–8.8)
RBC # BLD AUTO: 4.86 10E12/L (ref 4.4–5.9)
SODIUM SERPL-SCNC: 138 MMOL/L (ref 133–144)
WBC # BLD AUTO: 5.2 10E9/L (ref 4–11)

## 2019-08-03 PROCEDURE — 80320 DRUG SCREEN QUANTALCOHOLS: CPT | Performed by: FAMILY MEDICINE

## 2019-08-03 PROCEDURE — 80307 DRUG TEST PRSMV CHEM ANLYZR: CPT | Performed by: FAMILY MEDICINE

## 2019-08-03 PROCEDURE — 90791 PSYCH DIAGNOSTIC EVALUATION: CPT

## 2019-08-03 PROCEDURE — 82075 ASSAY OF BREATH ETHANOL: CPT | Performed by: FAMILY MEDICINE

## 2019-08-03 PROCEDURE — 80053 COMPREHEN METABOLIC PANEL: CPT | Performed by: FAMILY MEDICINE

## 2019-08-03 PROCEDURE — 25000132 ZZH RX MED GY IP 250 OP 250 PS 637: Performed by: FAMILY MEDICINE

## 2019-08-03 PROCEDURE — 85025 COMPLETE CBC W/AUTO DIFF WBC: CPT | Performed by: FAMILY MEDICINE

## 2019-08-03 PROCEDURE — 99285 EMERGENCY DEPT VISIT HI MDM: CPT | Mod: Z6 | Performed by: FAMILY MEDICINE

## 2019-08-03 PROCEDURE — 99285 EMERGENCY DEPT VISIT HI MDM: CPT | Mod: 25 | Performed by: FAMILY MEDICINE

## 2019-08-03 RX ORDER — OLANZAPINE 10 MG/1
10 TABLET, ORALLY DISINTEGRATING ORAL ONCE
Status: COMPLETED | OUTPATIENT
Start: 2019-08-03 | End: 2019-08-03

## 2019-08-03 RX ORDER — LORAZEPAM 1 MG/1
2 TABLET ORAL ONCE
Status: COMPLETED | OUTPATIENT
Start: 2019-08-03 | End: 2019-08-03

## 2019-08-03 RX ADMIN — OLANZAPINE 10 MG: 10 TABLET, ORALLY DISINTEGRATING ORAL at 20:42

## 2019-08-03 RX ADMIN — LORAZEPAM 2 MG: 1 TABLET ORAL at 21:58

## 2019-08-03 SDOH — ECONOMIC STABILITY - HOUSING INSECURITY: HOMELESSNESS UNSPECIFIED: Z59.00

## 2019-08-03 NOTE — ED NOTES
Bed: ED17  Expected date:   Expected time:   Means of arrival:   Comments:  [facility] - 54M anxiety-ETA 1 min

## 2019-08-03 NOTE — ED TRIAGE NOTES
Patient was shop lifting at a gas station and reported he is having anxiety issues due to living condition. Wanted to come to ED for .

## 2019-08-03 NOTE — DISCHARGE INSTRUCTIONS
Discharge Instructions  Mental Health Concerns    You were seen today for mental health concerns, such as depression, anxiety, or suicidal thinking. Your provider feels that you do not require hospitalization at this time. However, your symptoms may become worse, and you may need to return to the Emergency Department. Most treatments of depression and suicidal thoughts are a process rather than a single intervention.  Medications and counseling can take several weeks or more to help.    Generally, every Emergency Department visit should have a follow-up clinic visit with either a primary or a specialty clinic/provider. Please follow-up as instructed by your emergency provider today.    By accepting these discharge instructions:  You promise to not harm yourself or others.  You agree that if you feel you are becoming unable to keep that promise, you will do something to help yourself before you do anything to harm yourself or others.   You agree to keep any safety plan arranged on your visit here today.  You agree to take any medication prescribed or recommended by your provider.  If you are getting worse, you can contact a friend or a family member, contact your counselor or family provider, contact a crisis line, or other options discussed with the provider or therapist today.  At any time, you can call 911 and return to the Emergency Department for more help.  You understand that follow-up is essential to your treatment, and you will make and keep appointments recommended on your visit today.    How to improve your mental health and prevent suicide:  Involve others by letting family, friends, counselors know.  Do not isolate yourself.  Avoid alcohol or drugs. Remove weapons, poisons from your home.  Try to stick to routines for eating, sleeping and getting regular exercise.    Try to get into sunlight. Bright natural light not only treats seasonal affective disorder but also depression.  Increase safe activities  that you enjoy.    If you feel worse, contact 1-800-suicide (1-624.894.2093), or call 911, or your primary provider/counselor for additional assistance.    If you were given a prescription for medicine here today, be sure to read all of the information (including the package insert) that comes with your prescription.  This will include important information about the medicine, its side effects, and any warnings that you need to know about.  The pharmacist who fills the prescription can provide more information and answer questions you may have about the medicine.  If you have questions or concerns that the pharmacist cannot address, please call or return to the Emergency Department.   Remember that you can always come back to the Emergency Department if you are not able to see your regular provider in the amount of time listed above, if you get any new symptoms, or if there is anything that worries you.

## 2019-08-03 NOTE — ED PROVIDER NOTES
"  History     Chief Complaint:  Anxiety     HPI   John Barclay is a 54 year old male, with history of HIV, alcohol dependence and situational depression, who presents alone via EMS for evaluation of anxiety after being caught shoplifting at a gas station. Patient reports that he is currently homeless and was living with someone, but endorses it is a poor living situation with \"lack of food, lack of water and lack of sleep\". Patient reports that today, he was increasingly thirsty and had no money on him and was \"fed up\", so stole something from a gas station. PD was called and patient endorsed that he was feeling anxious as he \"lost my phone at a charging station so couldn't call anyone and lost my backpack with my HIV medications in it and now am behind\". Due to this, patient was presented for evaluation.     Here in the ED, patient acknowledges that he was seen multiple times in different ERs and states that he was in sober house, but is no longer at it. Chart review shows that patient was admitted to [facility] 7/29-7/31 after patient was brought by a peer at his sober house at that time with concerns of manic behavior, agitation and endorsement that he \"found God\". Patient was discharged, but later that same day, was presented to the  for suicidal ideation per family as patient stated that he \"would be better off dead\". Patient was also found to be noncompliant with his HIV meds at that time for over a year and concerns for drug abuse, though U-Tox was negative at that time and patient denies any recent meth use, stating his last use was weeks ago.     Allergies:  No Known Drug Allergies      Medications:    Triumeq  Hydoxyzine    Past Medical History:    Asymptomatic HIV disease   Situational depression  Uncomplicated alcohol dependence    Past Surgical History:    Orthopedic surgery    Family History:    Mother - arthritis  Father - colorectal cancer, diabetes  Sister - breast cancer    Social History:  The " "patient was accompanied to the ED by EMS.  Smoking Status: Yes - current every day smoker  Smokeless Tobacco: No  Alcohol Use: No  Drug Use: Former - methamphetamines (last use 7/10/19)   Marital Status:   [2]     Review of Systems   Psychiatric/Behavioral: The patient is nervous/anxious.    All other systems reviewed and are negative.      Physical Exam   Vitals:  Patient Vitals for the past 24 hrs:   BP Temp Temp src Heart Rate SpO2 Height Weight   08/02/19 2051 (!) 138/100 97.5  F (36.4  C) Oral 96 96 % 1.753 m (5' 9\") 84.8 kg (187 lb)      Physical Exam  Musculoskeletal:  Normal movement of all extremities without evidence for deficit.    Skin:  Warm and dry without rashes.    Neurologic:  Non-focal exam without asymmetric weakness or numbness.     Psychiatric:  Normal affect with appropriate interaction with examiner.     Emergency Department Course   Emergency Department Course:  Nursing notes and vitals reviewed.    (2130)   I performed an exam of the patient as documented above. History obtained from patient.    (2133)   Patient reports that he has no medical complaints and \"just wanna get out of here\". Asked if there was anything that could be done in the ED and he states that \"I'll take care of it, just wanna go\". Patient will be discharged as patient states he can call someone to pick him up.    I discussed the treatment plan with the patient. They expressed understanding of this plan and consented to discharge. They will be discharged home with instructions for care and follow up. In addition, the patient will return to the emergency department if their symptoms persist, worsen, if new symptoms arise or if there is any concern.  All questions were answered prior to discharge.    Impression & Plan      Medical Decision Making:  This 54-year-old gentleman with a history of polysubstance abuse presents to us by EMS for anxiety.  From what I can ascertain, the patient was shoplifting tonight.  When he " was caught and police arrested him, he complained to them of severe anxiety and chest pain and they transported him here to the emergency department.  However, once police left and the patient settled in here, he notes that all of his symptoms have since resolved.  On my history and physical, the patient has absolutely no complaints.  He does not want any medications or any treatment.  He simply requests to leave.  He does not appear to be intoxicated at this time.  I have to wonder if this visit to the emergency department was a ruse to get out of his legal issues with shoplifting.  Nonetheless, he is not holdable and states that he has people to stay with him places to go.  Therefore, he was discharged with advice to continue to work with his primary doctor and psychiatrist on his chronic medical issues.    Diagnosis:    ICD-10-CM    1. Acute reaction to situational stress F43.0         Disposition:   Discharged.    Scribe Disclosure:  I, Pauline Trammell, am serving as a scribe at 9:22 PM on 8/2/2019 to document services personally performed by Trierweiler, Chad A, MD, based on my observations and the provider's statements to me.  8/2/2019    EMERGENCY DEPARTMENT       Trierweiler, Chad A, MD  08/03/19 3530

## 2019-08-04 PROBLEM — F30.9 MANIA (H): Status: ACTIVE | Noted: 2019-08-04

## 2019-08-04 PROCEDURE — 99207 ZZC CDG-MDM COMPONENT: MEETS LOW - DOWN CODED: CPT | Performed by: PSYCHIATRY & NEUROLOGY

## 2019-08-04 PROCEDURE — 12400001 ZZH R&B MH UMMC

## 2019-08-04 PROCEDURE — 25000132 ZZH RX MED GY IP 250 OP 250 PS 637: Performed by: PSYCHIATRY & NEUROLOGY

## 2019-08-04 PROCEDURE — 99222 1ST HOSP IP/OBS MODERATE 55: CPT | Mod: AI | Performed by: PSYCHIATRY & NEUROLOGY

## 2019-08-04 PROCEDURE — H2032 ACTIVITY THERAPY, PER 15 MIN: HCPCS

## 2019-08-04 RX ORDER — OLANZAPINE 10 MG/1
10 TABLET ORAL
Status: DISCONTINUED | OUTPATIENT
Start: 2019-08-04 | End: 2019-08-09 | Stop reason: HOSPADM

## 2019-08-04 RX ORDER — ALUMINA, MAGNESIA, AND SIMETHICONE 2400; 2400; 240 MG/30ML; MG/30ML; MG/30ML
30 SUSPENSION ORAL EVERY 4 HOURS PRN
Status: DISCONTINUED | OUTPATIENT
Start: 2019-08-04 | End: 2019-08-09 | Stop reason: HOSPADM

## 2019-08-04 RX ORDER — OLANZAPINE 10 MG/2ML
10 INJECTION, POWDER, FOR SOLUTION INTRAMUSCULAR
Status: DISCONTINUED | OUTPATIENT
Start: 2019-08-04 | End: 2019-08-09 | Stop reason: HOSPADM

## 2019-08-04 RX ORDER — HYDROXYZINE HYDROCHLORIDE 25 MG/1
25-50 TABLET, FILM COATED ORAL EVERY 4 HOURS PRN
Status: DISCONTINUED | OUTPATIENT
Start: 2019-08-04 | End: 2019-08-09 | Stop reason: HOSPADM

## 2019-08-04 RX ORDER — OLANZAPINE 10 MG/1
10 TABLET ORAL AT BEDTIME
Status: DISCONTINUED | OUTPATIENT
Start: 2019-08-04 | End: 2019-08-06

## 2019-08-04 RX ORDER — TRAZODONE HYDROCHLORIDE 50 MG/1
50 TABLET, FILM COATED ORAL
Status: DISCONTINUED | OUTPATIENT
Start: 2019-08-04 | End: 2019-08-04

## 2019-08-04 RX ORDER — BISACODYL 10 MG
10 SUPPOSITORY, RECTAL RECTAL DAILY PRN
Status: DISCONTINUED | OUTPATIENT
Start: 2019-08-04 | End: 2019-08-09 | Stop reason: HOSPADM

## 2019-08-04 RX ORDER — ACETAMINOPHEN 325 MG/1
650 TABLET ORAL EVERY 4 HOURS PRN
Status: DISCONTINUED | OUTPATIENT
Start: 2019-08-04 | End: 2019-08-09 | Stop reason: HOSPADM

## 2019-08-04 RX ORDER — OLANZAPINE 5 MG/1
5 TABLET ORAL DAILY
Status: DISCONTINUED | OUTPATIENT
Start: 2019-08-04 | End: 2019-08-06

## 2019-08-04 RX ADMIN — OLANZAPINE 10 MG: 10 TABLET, FILM COATED ORAL at 20:04

## 2019-08-04 RX ADMIN — ABACAVIR SULFATE, DOLUTEGRAVIR SODIUM, LAMIVUDINE 1 TABLET: 600; 50; 300 TABLET, FILM COATED ORAL at 09:37

## 2019-08-04 RX ADMIN — OLANZAPINE 5 MG: 5 TABLET, FILM COATED ORAL at 13:52

## 2019-08-04 RX ADMIN — HYDROXYZINE HYDROCHLORIDE 50 MG: 25 TABLET ORAL at 09:38

## 2019-08-04 ASSESSMENT — ACTIVITIES OF DAILY LIVING (ADL)
DRESS: SCRUBS (BEHAVIORAL HEALTH)
LAUNDRY: UNABLE TO COMPLETE
HYGIENE/GROOMING: INDEPENDENT
HYGIENE/GROOMING: INDEPENDENT
DRESS: SCRUBS (BEHAVIORAL HEALTH)
LAUNDRY: UNABLE TO COMPLETE
ORAL_HYGIENE: INDEPENDENT
ORAL_HYGIENE: INDEPENDENT

## 2019-08-04 NOTE — PROGRESS NOTES
08/04/19 0238   Patient Belongings   Did you bring any home meds/supplements to the hospital?  Yes   Disposition of meds  Other (see comment)   Patient Belongings locker;other (see comments)   Patient Belongings Put in Hospital Secure Location (Security or Locker, etc.) jewelry;cash/credit card;cell phone/electronics;clothing;medication(s);wallet;shoes;other (see comments)   Belongings Search Yes   Clothing Search Yes   Second Staff Aminata AVILA   Comment Debit/EBT card sent to security; medication given to RN       Items placed in pt locker # 10:    1 red patterned pair of shorts  1 brown belt  1 brown t-shirt  1 pair brown flip flops  1 grey watch  1 cell phone  1 orange phone   1 small container containing liquid ([employer] CRIB)  1 concealer make up  1 chapstick  1 small bouncy ball  1 brown hair band  1 pink large rubber band  1 small white envelope containing various pieces of paper  1 [place] 's license  1 [place] commercial learner's permit      Items sent to security in envelope # [id number]:     Bank debit card (0226)- sent to security  [place] EBT card (3124)- send to security        *Pt's medication with RN          A               Admission:  I am responsible for any personal items that are not sent to the safe or pharmacy.  [facility] is not responsible for loss, theft or damage of any property in my possession.    Signature:  _________________________________ Date: _______  Time: _____                                              Staff Signature:  ____________________________ Date: ________  Time: _____      2nd Staff person, if patient is unable/unwilling to sign:    Signature: ________________________________ Date: ________  Time: _____     Discharge:  [facility] has returned all of my personal belongings:    Signature: _________________________________ Date: ________  Time: _____                                          Staff Signature:  ____________________________ Date: ________  Time:  _____

## 2019-08-04 NOTE — PROGRESS NOTES
"ADMISSION    53yo white male pt admitted from  ED on a 72H hold.  Pt has a history of Bipolar Illness with polysubstance abuse history including Marijuana, Alcohol, and Methamphetamines.  He was brought in by police following a disruption at an intersection whereby he was yelling delusional statements and throwing things.    He was agitated in the ED exhibiting delusional thinking and possibly auditory hallucinations. Though irritable he was redirectable and did not require restraint.  He was given Zyprexa and Ativan PRN and did fall asleep.    On arrival to the unit pt is mildly irritable and is somewhat sedated.   He was cooperative with safety search and did respond to some admission information questions however he sat with eyes closed through much of the interview.  He would episodically exhibit pressured speech which was incoherent at times.  He did make reference to being \"defrauded by a credit card company\" saying that he was going to call them the first thing in the morning.      Pt settled to bed immediately following admission interview.  He was sleeping within a short period of time.    "
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
## 2019-08-04 NOTE — PROGRESS NOTES
SPIRITUAL HEALTH SERVICES  SPIRITUAL ASSESSMENT Progress Note  [facility] ([facility]) [address]     REFERRAL SOURCE: Epic Consult Request    Spoke with MAXIMO. She said it would be ok to provide a Confucianism bulletin from today and have the unit  follow up Monday.    PLAN: Inform unit  to follow up    Bernadette Connor   Intern  Pager [phone]
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
## 2019-08-04 NOTE — PROGRESS NOTES
"Patient is appearing increasingly grandiose and manic throughout the day. Well still pleasant and amenable, he talked for over 25 minutes straight constantly jumping topics from hospital injustices to \"being like the terminator\" and outrunning 100,000 other runners at [facility]. He then rapidly switched topics to talking about buying houses for $300 and then to skydiving with his daughter. Later in the same string of conversation he talked about being extremely intelligent and perceptive. Pt's speech is rapid and very tangential.    Of note patient did talk about trying to commit suicide recently, states that he would have if he had been able to buy a gun. Talked specifically about what model of gun he would buy and how he would lean it against the table and shoot himself in the hurt \"so I can still have an open casket.\" Pt denies any current or ongoing suicidal thoughts, pt states \"it's a miracle I'm still alive and intend to use that.\"  "
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
## 2019-08-04 NOTE — ED NOTES
Pt came out requesting to talk to  about believed fraud on one of his accounts.  Pt hyper verbal, loud and argumentative.  Pt was able to be re-directed back to room to have quieter conversation.  Pt continued to be tangential, hyper verbal and paranoid.  Pt angry at one moment, forcing himself to be quiet the next and then tearful.  Pt was assured of safety and that we are here to help.  Pt admits he has not been sleeping.  Pt took meds with encouragement from PA and RN and asked to just focus on getting some rest and dealing with the rest of this stressors later when thoughts may be clearer.  Pt continues to pace in room and talking to self but is less agitated.

## 2019-08-05 PROCEDURE — 25000132 ZZH RX MED GY IP 250 OP 250 PS 637: Performed by: PSYCHIATRY & NEUROLOGY

## 2019-08-05 PROCEDURE — 12400001 ZZH R&B MH UMMC

## 2019-08-05 PROCEDURE — 99233 SBSQ HOSP IP/OBS HIGH 50: CPT | Performed by: PSYCHIATRY & NEUROLOGY

## 2019-08-05 RX ADMIN — ABACAVIR SULFATE, DOLUTEGRAVIR SODIUM, LAMIVUDINE 1 TABLET: 600; 50; 300 TABLET, FILM COATED ORAL at 08:49

## 2019-08-05 RX ADMIN — OLANZAPINE 10 MG: 10 TABLET, FILM COATED ORAL at 01:01

## 2019-08-05 RX ADMIN — OLANZAPINE 5 MG: 5 TABLET, FILM COATED ORAL at 08:49

## 2019-08-05 RX ADMIN — OLANZAPINE 10 MG: 10 TABLET, FILM COATED ORAL at 22:07

## 2019-08-05 ASSESSMENT — ACTIVITIES OF DAILY LIVING (ADL)
HYGIENE/GROOMING: INDEPENDENT
LAUNDRY: UNABLE TO COMPLETE
ORAL_HYGIENE: INDEPENDENT
DRESS: SCRUBS (BEHAVIORAL HEALTH)

## 2019-08-05 NOTE — PROGRESS NOTES
met with pt.   He says he would like me to refer him to [facility] for outpatient psychiatry.   He said he may be open to a Rule 25.    Regarding a place to live post discharge, he says he is going to call the sober house to ask if he can return.  It's call [facility] in [facility] (near [facility]

## 2019-08-05 NOTE — PLAN OF CARE
BEHAVIORAL TEAM DISCUSSION    Participants: dr grant, ann brunson rn, delisa joe [facility]  Progress: none; pt is a new admit.   He was admitted due to manic and psychotic symptoms  Continued Stay Criteria/Rationale: new admit  Medical/Physical:  Hx HIV+ and MRSA+  Precautions:   Behavioral Orders   Procedures    Assault precautions    Code 1 - Restrict to Unit    Routine Programming     As clinically indicated    Status 15     Every 15 minutes.    Suicide precautions     Patients on Suicide Precautions should have a Combination Diet ordered that includes a Diet selection(s) AND a Behavioral Tray selection for Safe Tray - with utensils, or Safe Tray - NO utensils       Plan: Meds per dr grant.   Assist with outpatient appointments.   Ask pt to consider another Rule 25 followed by whatever CD tx may be recommended.   Determine where pt will live.   He does not want nor like shelters but he is homeless at this time.    Rationale for change in precautions or plan:  no change at this time.
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
## 2019-08-05 NOTE — PROGRESS NOTES
"Patient has been awake, hyperverbal & focused on being discharged. Patient reminded of his 72 hour hold & informed that he could talk to his doctor in the morning. Patient then appeared to calm down for a brief while, but then got out of bed and began throwing things around in his room (pillows, book, linen) and was fairly loudly ruminating on being discharged.  Again, patient was  hyperverbal with rapid pacing while in his room.  Patient give option to taking oral medication or injectable medication, and he chose the PO medication.  Patient given Zyprexa 10 mg PO & settled back into bed.  Refused both of his pillows stating \"those are the worst crap of sh_t that I have ever had!\".    "

## 2019-08-05 NOTE — PROGRESS NOTES
"John was first observed by Music Therapist very engrossed in and enthusiastic about a car race on TV as group was starting.  The car race ended and MT introduced self to Arnold.  He was very overly enthusiastic off the bat, but did participate well in group.    Participated in Music Therapy group with focus on mood elevation, validation and decreasing anxiety and improved group cohesiveness. Engaged and cooperative in music listening interventions.   Showed progress in session goals.     Also engaged in songwriting intervention, in which he wrote the lyrics of a song for his daughter \"Martine, who will be 18 on August 22\".  He wrote a positive and glowing song for her and shared with the group.  Received positive affirmation for this.    Also gave positive affirmation to younger male peer on his song writing, which appeared impactful to this peer.      Does need redirection to stay on task, but readily accepted it from this writer.      Overall, appears to have an elevated mood but is cooperative, though did appear to be lacking insight at this time (stating he was going to go skydiving with his daughter soon and talking quickly and tangentially).    "

## 2019-08-06 PROCEDURE — 12400001 ZZH R&B MH UMMC

## 2019-08-06 PROCEDURE — 99232 SBSQ HOSP IP/OBS MODERATE 35: CPT | Performed by: PSYCHIATRY & NEUROLOGY

## 2019-08-06 PROCEDURE — 25000132 ZZH RX MED GY IP 250 OP 250 PS 637: Performed by: PSYCHIATRY & NEUROLOGY

## 2019-08-06 PROCEDURE — G0177 OPPS/PHP; TRAIN & EDUC SERV: HCPCS

## 2019-08-06 RX ORDER — OLANZAPINE 20 MG/1
20 TABLET ORAL AT BEDTIME
Qty: 30 TABLET | Refills: 0 | Status: SHIPPED | OUTPATIENT
Start: 2019-08-06 | End: 2019-10-25

## 2019-08-06 RX ORDER — OLANZAPINE 10 MG/1
20 TABLET ORAL AT BEDTIME
Status: DISCONTINUED | OUTPATIENT
Start: 2019-08-06 | End: 2019-08-09 | Stop reason: HOSPADM

## 2019-08-06 RX ADMIN — ABACAVIR SULFATE, DOLUTEGRAVIR SODIUM, LAMIVUDINE 1 TABLET: 600; 50; 300 TABLET, FILM COATED ORAL at 08:55

## 2019-08-06 RX ADMIN — OLANZAPINE 20 MG: 10 TABLET, FILM COATED ORAL at 19:35

## 2019-08-06 ASSESSMENT — ACTIVITIES OF DAILY LIVING (ADL)
HYGIENE/GROOMING: INDEPENDENT
ORAL_HYGIENE: INDEPENDENT
HYGIENE/GROOMING: INDEPENDENT
DRESS: SCRUBS (BEHAVIORAL HEALTH)
ORAL_HYGIENE: INDEPENDENT
DRESS: SCRUBS (BEHAVIORAL HEALTH)
LAUNDRY: UNABLE TO COMPLETE

## 2019-08-06 NOTE — PROGRESS NOTES
Pt was cooperative and calm today. Pt could be seen gesturing to the air, looking out the windows, and heard talking (hyperverbal) out loud to nobody. Pt also attended groups and participated. There were no concerns regarding elopement nor aggression during the shift.     08/06/19 1400   Behavioral Health   Hallucinations appears responding   Thinking delusional;distractable;poor concentration   Orientation person: oriented;place: oriented;date: oriented;time: oriented   Memory baseline memory   Insight poor   Judgement impaired   Eye Contact into space;at examiner   Affect full range affect   Mood elated;mood is calm   Physical Appearance/Attire attire appropriate to age and situation;appears stated age;neat   Hygiene well groomed   Suicidality other (see comments)  (none stated)   1. Wish to be Dead No   2. Non-Specific Active Suicidal Thoughts  No   Self Injury other (see comment)  (none stated nor observed)   Elopement   (nothing to note)   Activity hyperactive (agitated, impulsive);restless;other (see comment)  (visible in milieu, social, attended groups)   Speech flight of ideas;pressured;rambling   Medication Sensitivity no stated side effects;no observed side effects   Psychomotor / Gait balanced;steady   Activities of Daily Living   Hygiene/Grooming independent   Oral Hygiene independent   Dress scrubs (behavioral health)   Room Organization independent
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
## 2019-08-07 PROCEDURE — 25000132 ZZH RX MED GY IP 250 OP 250 PS 637: Performed by: PSYCHIATRY & NEUROLOGY

## 2019-08-07 PROCEDURE — 99232 SBSQ HOSP IP/OBS MODERATE 35: CPT | Performed by: PSYCHIATRY & NEUROLOGY

## 2019-08-07 PROCEDURE — 12400001 ZZH R&B MH UMMC

## 2019-08-07 PROCEDURE — H2032 ACTIVITY THERAPY, PER 15 MIN: HCPCS

## 2019-08-07 RX ADMIN — ABACAVIR SULFATE, DOLUTEGRAVIR SODIUM, LAMIVUDINE 1 TABLET: 600; 50; 300 TABLET, FILM COATED ORAL at 08:36

## 2019-08-07 RX ADMIN — HYDROXYZINE HYDROCHLORIDE 50 MG: 25 TABLET ORAL at 01:29

## 2019-08-07 RX ADMIN — OLANZAPINE 20 MG: 10 TABLET, FILM COATED ORAL at 22:28

## 2019-08-07 ASSESSMENT — ACTIVITIES OF DAILY LIVING (ADL)
HYGIENE/GROOMING: INDEPENDENT
ORAL_HYGIENE: INDEPENDENT
ORAL_HYGIENE: INDEPENDENT
HYGIENE/GROOMING: INDEPENDENT
DRESS: SCRUBS (BEHAVIORAL HEALTH)
DRESS: SCRUBS (BEHAVIORAL HEALTH);INDEPENDENT
LAUNDRY: UNABLE TO COMPLETE

## 2019-08-07 NOTE — PLAN OF CARE
"Pt continues to be disorganized and distractable. Overall appropriately social with peers and active in the milieu. Affect and mood grandiose. Pt used the [employer] phone to call the unit nursing desk, and when staff picked up he asked \"Is J- - (peer) there?\" When staff asked if he was calling the unit from the [employer] phone, he responded \"well this is awkward.\" He then hung up the phone and walked back to his room. Pt was compliant with scheduled medication this evening, and denied any side effects. He reports that his mood is \"good,\" and denies SI/SIB and HI. He has been participating in therapeutic programming. Will continue to monitor.   "

## 2019-08-07 NOTE — PROGRESS NOTES
Pt attended the morning OT clinic group, ended up being the only person in group.  Eager to finish the box he was painting for his daughter, which just had small brush strokes on it, despite working on it for over an hour yesterday.  Pt had an elaborate plan, then spent time, sort of playing with the paint bottles while listening to the radio, and finally decided to put four finger prints of paint on the box, and was finished.  Continues to present as disorganized.

## 2019-08-08 PROCEDURE — 25000132 ZZH RX MED GY IP 250 OP 250 PS 637: Performed by: PSYCHIATRY & NEUROLOGY

## 2019-08-08 PROCEDURE — 25000132 ZZH RX MED GY IP 250 OP 250 PS 637: Performed by: CLINICAL NURSE SPECIALIST

## 2019-08-08 PROCEDURE — 12400001 ZZH R&B MH UMMC

## 2019-08-08 PROCEDURE — H2032 ACTIVITY THERAPY, PER 15 MIN: HCPCS

## 2019-08-08 RX ADMIN — OLANZAPINE 20 MG: 10 TABLET, FILM COATED ORAL at 22:11

## 2019-08-08 RX ADMIN — ABACAVIR SULFATE, DOLUTEGRAVIR SODIUM, LAMIVUDINE 1 TABLET: 600; 50; 300 TABLET, FILM COATED ORAL at 09:28

## 2019-08-08 RX ADMIN — NICOTINE POLACRILEX 4 MG: 2 GUM, CHEWING BUCCAL at 19:13

## 2019-08-08 ASSESSMENT — ACTIVITIES OF DAILY LIVING (ADL)
ORAL_HYGIENE: INDEPENDENT
LAUNDRY: UNABLE TO COMPLETE
HYGIENE/GROOMING: INDEPENDENT
LAUNDRY: UNABLE TO COMPLETE
HYGIENE/GROOMING: INDEPENDENT
DRESS: SCRUBS (BEHAVIORAL HEALTH);INDEPENDENT
ORAL_HYGIENE: INDEPENDENT
DRESS: SCRUBS (BEHAVIORAL HEALTH);INDEPENDENT

## 2019-08-08 NOTE — PROGRESS NOTES
"[facility], [place]   Psychiatric Progress Note  John Barclay  [id number]  08/07/19    Chief Complaint: Continued medical care          Interim History:   The patient's care was discussed with the treatment team during the daily team meeting and/or staff's chart notes were reviewed.  Staff report patient has been disorganized calling the unit phone participating in group activities and slept about 6 hours.    Upon meeting with him he says that he is feeling good he denies any sleep problems side effects from medication any thoughts of harming himself or others.  Does not have any guilty feelings might have some grandiose believes about his future activities of helping people.  No hallucinations or paranoia that he describes no anhedonia or sadness.  He continues to describe future orientation without hopelessness helplessness of helping others and going back to sober housing.  He is not currently anxious and is as though the medications have been helpful.  He is not having any energy difficulties or attention problems.  Does not seem to be as delusional.  He eventually has nursing staff that he is interested in staying in the hospital voluntarily.         Medications:       abacavir-dolutegravir-LamiVUDine  1 tablet Oral Daily     OLANZapine  20 mg Oral At Bedtime          Allergies:     Allergies   Allergen Reactions     No Known Allergies      Cats Rash          Labs:   No results found for this or any previous visit (from the past 24 hour(s)).       Psychiatric Examination:     BP (!) 135/91 (BP Location: Right arm)   Pulse 85   Temp 97  F (36.1  C) (Tympanic)   Resp 16   Ht 1.753 m (5' 9\")   SpO2 96%   BMI 27.62 kg/m    Weight is 0 lbs 0 oz  Body mass index is 27.62 kg/m .                Sitting Orthostatic BP: 135/91      Sitting Orthostatic Pulse: 85 bpm      Standing Orthostatic BP: 119/87      Standing Orthostatic Pulse: 94 bpm       Weight over time:  There were no " vitals filed for this visit.    Orthostatic Vitals       Most Recent      Sitting Orthostatic /91 08/07 0749    Sitting Orthostatic Pulse (bpm) 85 08/07 0749    Standing Orthostatic /87 08/07 0749    Standing Orthostatic Pulse (bpm) 94 08/07 0749            Cardiometabolic risk assessment. 08/07/19      Reviewed patient profile for cardiometabolic risk factors    Date taken /Value  REFERENCE RANGE   Abdominal Obesity  (Waist Circumference)   See nursing flowsheet Women ?35 in (88 cm)   Men ?40 in (102 cm)      Triglycerides  Triglycerides   Date Value Ref Range Status   02/17/2016 155 (H) <150 mg/dL Final     Comment:     Borderline high:  150-199 mg/dl   High:             200-499 mg/dl   Very high:       >499 mg/dl         ?150 mg/dL (1.7 mmol/L) or current treatment for elevated triglycerides   HDL cholesterol  HDL Cholesterol   Date Value Ref Range Status   02/17/2016 19 (L) >39 mg/dL Final   ]   Women <50 mg/dL (1.3 mmol/L) in women or current treatment for low HDL cholesterol  Men <40 mg/dL (1 mmol/L) in men or current treatment for low HDL cholesterol     Fasting plasma glucose (FPG) Lab Results   Component Value Date     08/03/2019        FPG ?100 mg/dL (5.6 mmol/L) or treatment for elevated blood glucose   Blood pressure  BP Readings from Last 3 Encounters:   08/07/19 (!) 135/91   08/02/19 (!) 138/100   07/31/19 (!) 158/95    Blood pressure ?130/85 mmHg or treatment for elevated blood pressure   Family History  See family history         John is a 54-year-old male with short balding hair that is gray and blackish.  His speech is slightly fast and of an appropriate tone his leg which is intact.  His behavior is appropriate and he does not have any abnormal movements.  His affect is smiling.  His mood is described as good.  His thought content consists of the above without thoughts of harming himself or others though he may have some delusional content.  Thought process is accelerated  without looseness of association.  He does not appear to have abnormal perceptions currently.  His attention and concentration appears adequate.  His cognition and fund of knowledge appears normal.  His long-term/short-term/remote memory appear intact.  His insight and judgment are both limited.            Precautions:     Behavioral Orders   Procedures     Assault precautions     Code 1 - Restrict to Unit     Routine Programming     As clinically indicated     Status 15     Every 15 minutes.     Suicide precautions     Patients on Suicide Precautions should have a Combination Diet ordered that includes a Diet selection(s) AND a Behavioral Tray selection for Safe Tray - with utensils, or Safe Tray - NO utensils            DIagnoses:     Bipolar 1 disorder most recent episode manic  Methamphetamine use disorder currently in early remission  Alcohol use disorder currently in early remission  Cannabis use disorder currently in early region            Assessment & Plan:     Arnold is been doing well seems to be improving with medications to be on the unit.  He is participating in group activities but still to pick some disorganization and some slight confusion with some grandiose believes.  He has been sleeping which is beneficial seems to be stabilizing longer he is on the medication.  He was initially asking to leave and later decided to be voluntary.  If he does choose to leave the hospital this will be AGAINST MEDICAL ADVICE.  He is not holdable.    Switch from 72-hour hold to voluntary if asking to leave will be discharged AMA    The risks, benefits, alternatives and side effects have been discussed and are understood by the patient and other caregivers.      Augustine Olvera  [facility]      The following document has been created with voice recognition software and may contain unintentional word substitutions.    Non clinically relevant CMS requirements:  Clinical Global  Impressions  First:  Considering your total clinical experience with this particular patient population, how severe are the patient's symptoms at this time?: 7 (08/04/19 1413)  Compared to the patient's condition at the START of treatment, this patient's condition is:: 4 (08/04/19 1413)  Most recent:  Considering your total clinical experience with this particular patient population, how severe are the patient's symptoms at this time?: 7 (08/04/19 1413)  Compared to the patient's condition at the START of treatment, this patient's condition is:: 4 (08/04/19 1413)

## 2019-08-08 NOTE — PROGRESS NOTES
08/08/19 1535   Behavioral Health   Hallucinations denies / not responding to hallucinations   Thinking poor concentration;distractable   Orientation person: oriented;place: oriented   Memory baseline memory   Insight poor   Judgement impaired   Eye Contact at examiner   Affect incongruent   Mood grandiose;elated   Physical Appearance/Attire appears stated age;attire appropriate to age and situation;neat   Hygiene well groomed   Suicidality other (see comments)  (none noted)   1. Wish to be Dead   (none noted)   2. Non-Specific Active Suicidal Thoughts    (none noted)   Self Injury other (see comment)  (none noted)   Elopement   (none noted)   Activity restless;hyperactive (agitated, impulsive)   Speech flight of ideas;pressured;rambling;tangential   Medication Sensitivity no stated side effects;no observed side effects   Psychomotor / Gait balanced;steady   Activities of Daily Living   Hygiene/Grooming independent   Oral Hygiene independent   Dress scrubs (behavioral health);independent   Laundry unable to complete   Room Organization independent     Pt was present in the milieu for much of the morning. Pt was hyperactive, continually speaking out loud with himself very quickly. Pt was pleasant and respectful in interactions with peers and staff. No behavioral issues.

## 2019-08-08 NOTE — PROGRESS NOTES
08/07/19 2006   Art Therapy   Type of Intervention structured groups   Response participates with encouragement   Hours 1   Treatment Detail    (Art Therapy) - affirmation   Problem-Bipolar disorder, type I, imani with psychotic features  Amphetamine use disorder, severe, in early remission  Alcohol use disorder, moderate to severe, in early remission  Cannabis use disorder, moderate, in early remission  HIV positive  MRSA positive     Goal- process, express, cope and regulate feelings and emotions through Art Therapy directives.     Outcome- Pt. Enjoyed group and made multiple affirmation cards. He was very hyper verbal and his boundaries were worth watching with female pt F. He was just being friendly but overly so.

## 2019-08-08 NOTE — PROGRESS NOTES
Pt expressed a wide range of affect during the session, beginning in almost despair sharing about past addictions and his current life situation. 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  When pt was offered dance/movement therapy interventions, he became more elated, though accepted direction for self-regulation and social connection in his movements.  His verbal expressions also became more hyperbolic and hopeful.  Again, pt accepted grounding interventions to add practicality to his hopes and dreams.      Pt expressed gratitude for the time to be more physical, as well as the personal invitation to join the group.       08/08/19 2146   Dance Movement Therapy   Type of Intervention structured groups   Response participates with cues/redirection   Hours 1

## 2019-08-08 NOTE — PLAN OF CARE
"Patient appears visible on unit, active and social in milieu. Appropriate hygiene and grooming. Distractible, poor concentration. Appears hyperactive, restless. Patient has pressured, rambling speech once writer starts to complete check in with him. Appears elated, grandiose and slightly labile AEB exaggerated crying without tears during check in. Patient denies anxiety and depression, denies S/I and SIB and reports cheerfully, \"I'm the least suicidal I've ever been!\" Denies hallucinations, denies homicidal ideation. Patient appears to have poor insight, denial of illness and equates current issues to his history of meth use though he reports he has not used in the past month. Medication compliant.    Sister Kristy called, reports she is trying to support him the best she can, that he and she have another brother that has Bipolar and that patient's current behavior disturbances started approximately four years ago around the time he began using methamphetamine. Would like to speak with MD regarding patient's plan of care. Message sent to MD. Will continue to monitor.   "

## 2019-08-09 VITALS
OXYGEN SATURATION: 95 % | SYSTOLIC BLOOD PRESSURE: 129 MMHG | HEART RATE: 102 BPM | DIASTOLIC BLOOD PRESSURE: 83 MMHG | BODY MASS INDEX: 27.62 KG/M2 | TEMPERATURE: 96.2 F | RESPIRATION RATE: 16 BRPM | HEIGHT: 69 IN

## 2019-08-09 PROCEDURE — G0177 OPPS/PHP; TRAIN & EDUC SERV: HCPCS

## 2019-08-09 PROCEDURE — 99238 HOSP IP/OBS DSCHRG MGMT 30/<: CPT | Performed by: PSYCHIATRY & NEUROLOGY

## 2019-08-09 PROCEDURE — 25000132 ZZH RX MED GY IP 250 OP 250 PS 637: Performed by: PSYCHIATRY & NEUROLOGY

## 2019-08-09 RX ADMIN — ABACAVIR SULFATE, DOLUTEGRAVIR SODIUM, LAMIVUDINE 1 TABLET: 600; 50; 300 TABLET, FILM COATED ORAL at 08:21

## 2019-08-09 ASSESSMENT — ACTIVITIES OF DAILY LIVING (ADL)
HYGIENE/GROOMING: INDEPENDENT
ORAL_HYGIENE: INDEPENDENT
DRESS: SCRUBS (BEHAVIORAL HEALTH)

## 2019-08-09 NOTE — PROGRESS NOTES
08/08/19 2100   Therapeutic Recreation   Type of Intervention structured groups   Activity game   Response Participates, initiates socially appropriate   Hours 1     Pt participated in Therapeutic Recreation group with focus on leisure participation, social engagement, and strategic cognitive reasoning.  Engaged and focused in the group recreational intervention via a group game.  Pt was a full participant throughout the entire duration of group. Showed progress in session goals. Pt was very hyper verbal, expanding on many things discussed and part of the activity. Pt stated he was competitive and wanted to get through each card in the game before group was over, going as quickly as he could. Pt shifted from using the directives for the activity from asking questions to giving hints instead. Pt stayed engaged and focused on the activity and stated it was far better than hanging out in his room all day.

## 2019-08-09 NOTE — DISCHARGE SUMMARY
"Psychiatric Discharge Summary    John Barclay MRN# [id number]   Age: 54 year old YOB: 1965     Date of Admission:  8/3/2019  Date of Discharge:  8/9/2019  Admitting Physician:  Augustine Michele MD  Discharge Physician:  Lauren Taylor MD (Contact: [phone])         Event Leading to Hospitalization:   Per H&P:    Records indicate the patient is a 54-year-old male who was brought to the emergency department on 8/2 via ambulance after police involvement after being caught shoplifting at a gas station.  While in the ED, the patient reported that he is homeless with lack of food, lack of water and lack of sleep.  He said that on the day of his evaluation, he became increasingly thirsty and had no money on him.  He was \"fed up\" and so stole something from the gas station.  When police were called, the patient reported severe anxiety and chest pain so EMS brought him to the emergency department.  He was assessed in the emergency department and ultimately was discharged due to his lack of desire for medications or treatment.      Patient was then brought to the emergency department the following day, on 8/3, via ambulance for evaluation of agitation and imani.  Patient was found agitated at an intersection yelling, cursing, and throwing items at bystanders.  When police approached him, the patient reported seeing people, hearing voices, and reported that he could levitate.  He stated that he is \"an exceptional person.  My mind works extremely fast.\"  He also appeared to be responding to internal stimuli. He reported that he had not slept in weeks and that he was homeless.  Patient denied SI/HI.  Records indicate that this is the fourth visit to an emergency department within the last 7 days, all via ambulance or police.  In the ED, the patient was agitated, somewhat hyperactive, exhibiting pressured speech and tangential thinking with evidence of paranoid delusions.  Patient was making multiple " "grandiose statements and required verbal redirection for agitation.  Patient did agree to take oral Zyprexa.  Urine drug screen was negative.  Patient was placed on a 72-hour hold and was admitted to psychiatric unit.     Upon interview with the patient, he was quite tangential as he spent several minutes discussing childhood events when asked about events just prior to this hospitalization.  He made several grandiose statements.  He endorsed several symptoms of imani, including days of sleeplessness, impulsivity, agitation, racing thoughts, increased goal-directed behaviors, and an abrupt increase in energy.  Patient stated that he felt this way on one occasion in the past.  He states that he has never been given a diagnosis of bipolar disorder.  He agrees that he is experiencing symptoms of imani, though said \"it is very situational because of not having a job and not having a home.\"  He later mentioned that he has a brother who has bipolar disorder.  After discussion regarding R/B/A of Zyprexa, patient agreed to a trial, and said \"hopefully I can get some sleep.\"  Patient said that he is willing to remain in the hospital \"until the doctor thinks I am ready to go.\"  Patient denied SI/HI.  Patient denied any use of stimulants, other illicit substances, alcohol.  He states that he has been sober from methamphetamine for the past 25 days.       See Admission note by Lauren Taylor MD on 8/4/19 for additional details.          Diagnoses:     Bipolar 1 disorder most recent episode manic  Methamphetamine use disorder currently in early remission  Alcohol use disorder currently in early remission  Cannabis use disorder currently in early remission         Labs:     Recent Results (from the past 168 hour(s))   Alcohol breath test POCT    Collection Time: 08/03/19  7:45 PM   Result Value Ref Range    Alcohol Breath Test 0.00 0.00 - 0.01   Drug abuse screen 6 urine (tox)    Collection Time: 08/03/19  7:47 PM "   Result Value Ref Range    Amphetamine Qual Urine Negative NEG^Negative    Barbiturates Qual Urine Negative NEG^Negative    Benzodiazepine Qual Urine Negative NEG^Negative    Cannabinoids Qual Urine Negative NEG^Negative    Cocaine Qual Urine Negative NEG^Negative    Ethanol Qual Urine Negative NEG^Negative    Opiates Qualitative Urine Negative NEG^Negative   CBC with platelets differential    Collection Time: 08/03/19  9:28 PM   Result Value Ref Range    WBC 5.2 4.0 - 11.0 10e9/L    RBC Count 4.86 4.4 - 5.9 10e12/L    Hemoglobin 15.0 13.3 - 17.7 g/dL    Hematocrit 43.8 40.0 - 53.0 %    MCV 90 78 - 100 fl    MCH 30.9 26.5 - 33.0 pg    MCHC 34.2 31.5 - 36.5 g/dL    RDW 13.0 10.0 - 15.0 %    Platelet Count 194 150 - 450 10e9/L    Diff Method Automated Method     % Neutrophils 39.2 %    % Lymphocytes 50.7 %    % Monocytes 5.4 %    % Eosinophils 3.9 %    % Basophils 0.6 %    % Immature Granulocytes 0.2 %    Nucleated RBCs 0 0 /100    Absolute Neutrophil 2.0 1.6 - 8.3 10e9/L    Absolute Lymphocytes 2.6 0.8 - 5.3 10e9/L    Absolute Monocytes 0.3 0.0 - 1.3 10e9/L    Absolute Eosinophils 0.2 0.0 - 0.7 10e9/L    Absolute Basophils 0.0 0.0 - 0.2 10e9/L    Abs Immature Granulocytes 0.0 0 - 0.4 10e9/L    Absolute Nucleated RBC 0.0    Comprehensive metabolic panel    Collection Time: 08/03/19  9:28 PM   Result Value Ref Range    Sodium 138 133 - 144 mmol/L    Potassium 3.6 3.4 - 5.3 mmol/L    Chloride 106 94 - 109 mmol/L    Carbon Dioxide 25 20 - 32 mmol/L    Anion Gap 7 3 - 14 mmol/L    Glucose 117 (H) 70 - 99 mg/dL    Urea Nitrogen 22 7 - 30 mg/dL    Creatinine 1.10 0.66 - 1.25 mg/dL    GFR Estimate 75 >60 mL/min/[1.73_m2]    GFR Estimate If Black 87 >60 mL/min/[1.73_m2]    Calcium 8.1 (L) 8.5 - 10.1 mg/dL    Bilirubin Total 0.4 0.2 - 1.3 mg/dL    Albumin 3.8 3.4 - 5.0 g/dL    Protein Total 7.8 6.8 - 8.8 g/dL    Alkaline Phosphatase 59 40 - 150 U/L    ALT 40 0 - 70 U/L    AST 52 (H) 0 - 45 U/L          Consults:   No  "consultations were requested during this admission         Hospital Course:   John Barclay was admitted to [facility] with attending Lauren Taylor MD on a 72 hour mental health hold, but patient subsequently signed in voluntarily. The patient was placed under status 15 (15 minute checks) to ensure patient safety.     On admission, Zyprexa was initiated after R/B/A were discussed with patient at length.  Patient tolerated this well without reported side effects.  It was titrated to a total daily dose of 20 mg at bedtime.  Of note, patient was sent with a 30-day supply of this medication.    Care was resumed by Dr. Michele from 8/5 to 8/7. Per Dr. Michele's progress note dated 8/7/19:    Arnold is been doing well seems to be improving with medications to be on the unit.  He is participating in group activities but still to pick some disorganization and some slight confusion with some grandiose believes.  He has been sleeping which is beneficial seems to be stabilizing longer he is on the medication.  He was initially asking to leave and later decided to be voluntary.  If he does choose to leave the hospital this will be AGAINST MEDICAL ADVICE.  He is not holdable.     Switch from 72-hour hold to voluntary if asking to leave will be discharged AMA      On 8/9, I was informed that patient signed a 12-hour intent to leave.  Upon interview with the patient, he appeared to be improved compared to on admission as evidenced by more organized, linear, logical thought process, improved insight and judgment, less prominent grandiose thoughts, and no evidence of paranoia or delusional thinking.  Patient is said that since admission, he has had \"no major mood swings, and I feel a lot better.\"  He said that it is very difficult for him to be in a restrictive environment, and he really \"just wants to get outside.\"  Patient said \"I have recovered enough to be absolutely fine when I leave here.\"  Patient said that he " "plans to use his phone to contact various sober housing facilities so that he could go to a sober house upon discharge.  He said that if there was not one available, he has enough savings to get \"a safe hotel\" until he can get into a sober living environment.  Patient said \"I know I can get it all figured out, and I really do feel safe leaving today.\"  Patient does continue to exhibit signs of imani, including elevated energy, rapid speech, and grandiose thinking.  However, patient adamantly denies SI, SIB, HI. He also said that he fully intends on maintaining sobriety upon discharge. He was very much future oriented.     John Barclay did participate in groups and was visible in the milieu. He was pleasant and cooperative with staff. No evidence of significant agitation or aggressive behavior.     The patient's symptoms of imani improved.     John Barclay was released to home. At the time of discharge John Barclay was determined to not be a danger to himself or others. He was discharged AMA.    SUICIDE RISK ASSESSMENT:  Today John Barclay denies SI, SIB, and HI.  Patient also agreed to call 911/present to ED if any imminent safety concerns arise, including emergence of SI or worsening manic symptoms. Patient was provided with crisis resources at the time of discharge. Patient agreed to further reduce risk of self-harm by completely abstaining from illicit substances and alcohol, and agreed to remain medication adherent. Expressed understanding of the risks associated with alcohol use, illicit drug use, and medication non-adherence.             Discharge Medications:     Current Discharge Medication List      START taking these medications    Details   OLANZapine (ZYPREXA) 20 MG tablet Take 1 tablet (20 mg) by mouth At Bedtime  Qty: 30 tablet, Refills: 0    Associated Diagnoses: Bipolar affective disorder, currently manic, moderate (H)         CONTINUE these medications which have CHANGED    Details "   abacavir-dolutegravir-LamiVUDine (TRIUMEQ) 600- MG per tablet Take 1 tablet by mouth daily  Qty: 30 tablet, Refills: 0    Associated Diagnoses: Human immunodeficiency virus (HIV) disease (H)         STOP taking these medications       hydrOXYzine (VISTARIL) 50 MG capsule Comments:   Reason for Stopping:                    Psychiatric Examination:   John is a 54-year-old male with short balding hair that is gray and blackish.  His speech is slightly fast and of an appropriate tone which is intact.  His behavior is appropriate and he does not have any abnormal movements.  His affect is smiling, bright.  His mood is described as good.  His thought content consists of the above without thoughts of harming himself or others though he may have some ongoing grandiose thoughts.  Thought process is accelerated without looseness of association.  He does not appear to have abnormal perceptions currently.  His attention and concentration appears adequate.  His cognition and fund of knowledge appears normal.  His long-term/short-term/remote memory appear intact.  His insight and judgment are both fair.         Discharge Plan:      Mental Health Follow-Up:      Appointments:  You have  appointments from 9 am to 12 noon on Thursday September 5th.   You will see at least two providers:   LIZBETH Toribio on the 3rd floor at 9am.    Then, you will see the med provider, Gissel Mccabe NP.  [facility]  [address], [place]  Ph:  [phone]    Fx:  [phone]  Fx: Face sheet, MAR, H & BRYCE LEYVA, Discharge Summary     -----------------------------------------------------  Therapist:   Nisha Sosa at [facility] ([facility] in [facility])    Phone:   [phone]    FX:   [phone]      Attend all scheduled appointments with your outpatient providers. Call at least 24 hours in advance if you need to reschedule an appointment to ensure continued access to your outpatient  providers.   Major Treatments, Procedures and Findings:  You were provided with: a psychiatric assessment, assessed for medical stability, medication evaluation and/or management and group therapy     Symptoms to Report: feeling more aggressive, increased confusion, losing more sleep, mood getting worse or thoughts of suicide     Early warning signs can include: increased depression or anxiety sleep disturbances increased thoughts or behaviors of suicide or self-harm  increased unusual thinking, such as paranoia or hearing voices     Safety and Wellness:  Take all medicines as directed.  Make no changes unless your doctor suggests them.      Follow treatment recommendations.  Refrain from alcohol and non-prescribed drugs.  If there is a concern for safety, call 911.     Resources:   COPE (Community Outreach for Psychiatric Emergencies): 551.939.8580.  Available 24-hours a day, 7 days a week.  COPE professionals are available to go where you are. Telephone consultations are also available.     Crisis Intervention: 429.906.1157 or 630-406-4611 (TTY: 716.942.7658).  Call anytime for help.  National [place] on [place]ness (www.[place].nikkie.org): 632.894.1085 or 829-971-2798.  Alcoholics Anonymous (www.alcoholics-anonymous.org): Check your phone book for your local chapter.  Suicide Awareness Voices of Education (SAVE) (www.save.org): 747-425-KJIY (0470)  National Suicide Prevention Line (www.mentalhealthmn.org): 130-139-TUWU (5399)  Mental Health Consumer/Survivor Network of [place] (www.mhcsn.net): 767.614.9849 or 052-480-2881  Mental Health Association of [place] (www.mentalhealth.org): 784.603.9359 or 907-687-5125     The treatment team has appreciated the opportunity to work with you. If you have any questions or concerns our unit number is [phone].    Attestation:  The patient has been seen and evaluated by me,  Lauren Taylor MD

## 2019-08-09 NOTE — PROGRESS NOTES
"Pt talked about \"being hurt at [facility], by security, being discharged + brought here by police who said,I was acting strangely.  It was a misunderstanding.  I'm actually glad I ended up here, because I do need a place to stay, until my sister can help me find an apt. Or something else.\"  Pt denies dep, SI, SIB, but confirms anx 2 of 10.  \"I'm just worried, if I can be here until I have a more permeant place to live.\"  "

## 2019-08-09 NOTE — DISCHARGE INSTRUCTIONS
Behavioral Discharge Planning and Instructions      Summary:  You were admitted to [facility] due to psychosis and imani.   You were under the care of Dr Olvera and Dr Taylor.  You were cooperative with treatment / agreeable to take the medication which was prescribed.   Your symptoms improved, however the doctor recommended you stay in the hospital for more treatment but you declined.     You are being discharged today, Against Medical Advice to a friend or family member's home.  You also stated you are still trying to be re-admitted to your former sober house ([facility].)       Diagnoses: Bipolar 1 disorder most recent episode manic  Methamphetamine use disorder currently in early remission  Alcohol use disorder currently in early remission  Cannabis use disorder currently in early region      Mental Health Follow-Up:     Appointments:  You have  appointments from 9 am to 12 noon on Thursday September 5th.   You will see at least two providers:   LIZBETH Toribio on the 3rd floor at 9am.    Then, you will see the med provider, Gissel Mccabe NP.  [facility]  [address], [facility]  Ph:  [phone]    Fx:  [phone]  Fx: Face sheet, MAR, H & P, AVS, Discharge Summary    -----------------------------------------------------  Therapist:   Nisha Sosa at [facility] ([facility] in [facility]    Phone:   [phone]    FX:   [phone]     Attend all scheduled appointments with your outpatient providers. Call at least 24 hours in advance if you need to reschedule an appointment to ensure continued access to your outpatient providers.   Major Treatments, Procedures and Findings:  You were provided with: a psychiatric assessment, assessed for medical stability, medication evaluation and/or management and group therapy    Symptoms to Report: feeling more aggressive, increased confusion, losing more sleep, mood getting worse or thoughts of  suicide    Early warning signs can include: increased depression or anxiety sleep disturbances increased thoughts or behaviors of suicide or self-harm  increased unusual thinking, such as paranoia or hearing voices    Safety and Wellness:  Take all medicines as directed.  Make no changes unless your doctor suggests them.      Follow treatment recommendations.  Refrain from alcohol and non-prescribed drugs.  If there is a concern for safety, call 911.    Resources:   COPE (Community Outreach for Psychiatric Emergencies): 646.654.8007.  Available 24-hours a day, 7 days a week.  COPE professionals are available to go where you are. Telephone consultations are also available.    Crisis Intervention: 127.930.5617 or 620-630-5935 (TTY: 342.389.4663).  Call anytime for help.  National [place] on Mental Illness (www.mn.nikkie.org): 912.778.1583 or 372-317-1306.  Alcoholics Anonymous (www.alcoholics-anonymous.org): Check your phone book for your local chapter.  Suicide Awareness Voices of Education (SAVE) (www.save.org): 513-695-VFHQ (5183)  National Suicide Prevention Line (www.mentalhealthmn.org): 984-530-SUSG (7896)  Mental Health Consumer/Survivor Network of [place] (www.mhcsn.net): 700.935.2764 or 537-194-4310  Mental Health Association of [place] (www.mentalhealth.org): 673.882.3819 or 973-260-4921    The treatment team has appreciated the opportunity to work with you. If you have any questions or concerns our unit number is [phone].

## 2019-08-09 NOTE — PROGRESS NOTES
Pt was discharged AMA. Discharge teaching, including follow up care and medication teaching, complete. Patient is able to contract for safety and denies SI/SIB/HI. All belongings were returned to patient.

## 2019-08-12 ENCOUNTER — TELEPHONE (OUTPATIENT)
Dept: FAMILY MEDICINE | Facility: CLINIC | Age: 54
End: 2019-08-12

## 2019-08-12 NOTE — TELEPHONE ENCOUNTER
ED / Discharge Outreach Protocol    Patient Contact    Attempt # 1    Was call answered?  No.  Left message on [employer] with information to RANOLD FORREST RN    Discharge Plan:      Mental Health Follow-Up:      Appointments:  You have  appointments from 9 am to 12 noon on Thursday September 5th.   You will see at least two providers:   LIZBETH Toribio on the 3rd floor at 9am.    Then, you will see the med provider, Gissel Mccabe NP.  [facility]  [address], [place]  Ph:  [phone]    Fx:  [phone]  Fx: Face sheet, MAR, H & P, AVS, Discharge Summary

## 2019-08-12 NOTE — TELEPHONE ENCOUNTER
Chief Complaint: Bipolar Affective Disorder, Currently Manic, Moderate (H),FRI 09-AUG-2019,ed/ip  2 / 1    [phone] (home)

## 2019-08-13 NOTE — TELEPHONE ENCOUNTER
ED / Discharge Outreach Protocol    Patient Contact    Attempt # 2    Was call answered?  No.  Unable to leave message. Phone rang several times. No answer, then got busy signal    Chrystal FORREST RN

## 2019-10-07 DIAGNOSIS — B20 HUMAN IMMUNODEFICIENCY VIRUS (HIV) DISEASE (H): Primary | ICD-10-CM

## 2019-10-24 ENCOUNTER — TELEPHONE (OUTPATIENT)
Dept: INFECTIOUS DISEASES | Facility: CLINIC | Age: 54
End: 2019-10-24

## 2019-10-25 ENCOUNTER — OFFICE VISIT (OUTPATIENT)
Dept: INFECTIOUS DISEASES | Facility: CLINIC | Age: 54
End: 2019-10-25
Attending: INTERNAL MEDICINE
Payer: COMMERCIAL

## 2019-10-25 VITALS
OXYGEN SATURATION: 95 % | DIASTOLIC BLOOD PRESSURE: 91 MMHG | HEART RATE: 107 BPM | SYSTOLIC BLOOD PRESSURE: 123 MMHG | TEMPERATURE: 97.9 F | BODY MASS INDEX: 31.29 KG/M2 | WEIGHT: 211.9 LBS

## 2019-10-25 DIAGNOSIS — B20 HUMAN IMMUNODEFICIENCY VIRUS (HIV) DISEASE (H): Primary | ICD-10-CM

## 2019-10-25 DIAGNOSIS — B20 HUMAN IMMUNODEFICIENCY VIRUS (HIV) DISEASE (H): ICD-10-CM

## 2019-10-25 DIAGNOSIS — Z72.51 HIGH RISK SEXUAL BEHAVIOR, UNSPECIFIED TYPE: ICD-10-CM

## 2019-10-25 LAB
ALBUMIN SERPL-MCNC: 4 G/DL (ref 3.4–5)
ALP SERPL-CCNC: 54 U/L (ref 40–150)
ALT SERPL W P-5'-P-CCNC: 26 U/L (ref 0–70)
ANION GAP SERPL CALCULATED.3IONS-SCNC: 8 MMOL/L (ref 3–14)
AST SERPL W P-5'-P-CCNC: 20 U/L (ref 0–45)
BASOPHILS # BLD AUTO: 0 10E9/L (ref 0–0.2)
BASOPHILS NFR BLD AUTO: 0.6 %
BILIRUB SERPL-MCNC: 0.4 MG/DL (ref 0.2–1.3)
BUN SERPL-MCNC: 16 MG/DL (ref 7–30)
CALCIUM SERPL-MCNC: 8.9 MG/DL (ref 8.5–10.1)
CHLORIDE SERPL-SCNC: 107 MMOL/L (ref 94–109)
CO2 SERPL-SCNC: 25 MMOL/L (ref 20–32)
CREAT SERPL-MCNC: 1.15 MG/DL (ref 0.66–1.25)
DIFFERENTIAL METHOD BLD: NORMAL
EOSINOPHIL # BLD AUTO: 0.1 10E9/L (ref 0–0.7)
EOSINOPHIL NFR BLD AUTO: 1.4 %
ERYTHROCYTE [DISTWIDTH] IN BLOOD BY AUTOMATED COUNT: 13.9 % (ref 10–15)
GFR SERPL CREATININE-BSD FRML MDRD: 71 ML/MIN/{1.73_M2}
GLUCOSE SERPL-MCNC: 84 MG/DL (ref 70–99)
HCT VFR BLD AUTO: 45.2 % (ref 40–53)
HGB BLD-MCNC: 16 G/DL (ref 13.3–17.7)
IMM GRANULOCYTES # BLD: 0 10E9/L (ref 0–0.4)
IMM GRANULOCYTES NFR BLD: 0.2 %
LYMPHOCYTES # BLD AUTO: 2.4 10E9/L (ref 0.8–5.3)
LYMPHOCYTES NFR BLD AUTO: 38.9 %
MCH RBC QN AUTO: 32.9 PG (ref 26.5–33)
MCHC RBC AUTO-ENTMCNC: 35.4 G/DL (ref 31.5–36.5)
MCV RBC AUTO: 93 FL (ref 78–100)
MONOCYTES # BLD AUTO: 0.6 10E9/L (ref 0–1.3)
MONOCYTES NFR BLD AUTO: 9 %
NEUTROPHILS # BLD AUTO: 3.1 10E9/L (ref 1.6–8.3)
NEUTROPHILS NFR BLD AUTO: 49.9 %
NRBC # BLD AUTO: 0 10*3/UL
NRBC BLD AUTO-RTO: 0 /100
PLATELET # BLD AUTO: 180 10E9/L (ref 150–450)
POTASSIUM SERPL-SCNC: 3.8 MMOL/L (ref 3.4–5.3)
PROT SERPL-MCNC: 8.3 G/DL (ref 6.8–8.8)
RBC # BLD AUTO: 4.87 10E12/L (ref 4.4–5.9)
SODIUM SERPL-SCNC: 139 MMOL/L (ref 133–144)
WBC # BLD AUTO: 6.2 10E9/L (ref 4–11)

## 2019-10-25 PROCEDURE — 87536 HIV-1 QUANT&REVRSE TRNSCRPJ: CPT | Performed by: INTERNAL MEDICINE

## 2019-10-25 PROCEDURE — 80053 COMPREHEN METABOLIC PANEL: CPT | Performed by: INTERNAL MEDICINE

## 2019-10-25 PROCEDURE — 86592 SYPHILIS TEST NON-TREP QUAL: CPT | Performed by: INTERNAL MEDICINE

## 2019-10-25 PROCEDURE — 36415 COLL VENOUS BLD VENIPUNCTURE: CPT | Performed by: INTERNAL MEDICINE

## 2019-10-25 PROCEDURE — 85025 COMPLETE CBC W/AUTO DIFF WBC: CPT | Performed by: INTERNAL MEDICINE

## 2019-10-25 PROCEDURE — 87491 CHLMYD TRACH DNA AMP PROBE: CPT | Performed by: INTERNAL MEDICINE

## 2019-10-25 PROCEDURE — 86803 HEPATITIS C AB TEST: CPT | Performed by: INTERNAL MEDICINE

## 2019-10-25 PROCEDURE — 86360 T CELL ABSOLUTE COUNT/RATIO: CPT | Performed by: INTERNAL MEDICINE

## 2019-10-25 PROCEDURE — 87591 N.GONORRHOEAE DNA AMP PROB: CPT | Performed by: INTERNAL MEDICINE

## 2019-10-25 PROCEDURE — 86359 T CELLS TOTAL COUNT: CPT | Performed by: INTERNAL MEDICINE

## 2019-10-25 PROCEDURE — G0463 HOSPITAL OUTPT CLINIC VISIT: HCPCS | Mod: ZF

## 2019-10-25 RX ORDER — BUPROPION HYDROCHLORIDE 300 MG/1
TABLET ORAL
COMMUNITY
Start: 2019-10-17

## 2019-10-25 RX ORDER — BUPROPION HYDROCHLORIDE 150 MG/1
TABLET ORAL
COMMUNITY
Start: 2019-10-17

## 2019-10-25 RX ORDER — LORATADINE 10 MG/1
TABLET ORAL
COMMUNITY
Start: 2019-10-21

## 2019-10-25 ASSESSMENT — PAIN SCALES - GENERAL: PAINLEVEL: NO PAIN (0)

## 2019-10-25 NOTE — LETTER
10/25/2019      RE: John Barclay  [address] [facility] [address]         HIV follow-up after being lost to care for almost 2 years, and without labs for almost 3 years.      - Labs today for routine HIV monitoring and for STI screen    - Refilled Triumeq    - Asked patient to return in 1 month to discuss HIV health maintenance.  If he is able to engage in clinic, we can consider a switch to Biktarvy for the smaller pill size.  Due to patient's absence from clinic until today, I would like to see if he will return as requested.  After a switch, we do want to be able to repeat labs 1 month later, and I would like to see if he can maintain engagement before pursuing this switch.        Breezy Khoury MD, MS  Infectious Disease    Time:  I spent 30 minutes in direct care with this patient, including >50% of time face-to-face with the patient counseling about the importance of engagement in HIV care.    Breezy Khoury MD

## 2019-10-25 NOTE — NURSING NOTE
"Chief Complaint   Patient presents with     Recheck Medication     Follow up B20     Vital signs:  Temp: 97.9  F (36.6  C)   BP: (!) 123/91 Pulse: 107     SpO2: 95 %       Weight: 96.1 kg (211 lb 14.4 oz)  Estimated body mass index is 31.29 kg/m  as calculated from the following:    Height as of 8/3/19: 1.753 m (5' 9\").    Weight as of this encounter: 96.1 kg (211 lb 14.4 oz).      Dara Chambers, [facility]    "

## 2019-10-25 NOTE — PROGRESS NOTES
Infectious Disease Clinic Note    Reason for visit:  Return for follow-up HIV infection    SUBJECTIVE:    Arnold is a 53 yo man with HIV infection.  He was last seen in our clinic 2/1/2018 by my colleague Dr. Paty Whitaker, who has since left this practice.  He did not have labs drawn that day, and so our last HIV related labs were actually 1/26/2017.  Arnold attributes his absence from care to his previous issues with IVDU with methamphetamine, homelessness, and mental illness.      He presents today because he is sober, in a stable living environment, and is looking to get back into care.  He is currently in a residential chemical dependency treatment program, and will transition to an outpatient program as of 10/31/19.   He estimates that he has been taking his Triumeq daily for the past 2 months.  He has not noted any adverse effects from the Triumeq.    He actually began his sobriety about July on his own, but has felt the support from the residential program has been very helpful.    OBJECTIVE:    Current Outpatient Medications   Medication     abacavir-dolutegravir-LamiVUDine (TRIUMEQ) 600- MG per tablet     abacavir-dolutegravir-LamiVUDine (TRIUMEQ) 600- MG per tablet     buPROPion (WELLBUTRIN XL) 150 MG 24 hr tablet     buPROPion (WELLBUTRIN XL) 300 MG 24 hr tablet     loratadine (CLARITIN) 10 MG tablet     OLANZapine (ZYPREXA) 20 MG tablet     No current facility-administered medications for this visit.      On exam:  BP (!) 123/91   Pulse 107   Temp 97.9  F (36.6  C)   Wt 96.1 kg (211 lb 14.4 oz)   SpO2 95%   BMI 31.29 kg/m    General: awake, alert, presents as sober and pleasant, cooperative  CV: RRR, no m/r/g  Resp: CTAB, no increased work of breathing  Skin:  Diffuse scarring all over the body, which patient attributes to necrotic skin lesions from missing veins/popping the methamphetamine in the past.  No active skin lesions.    Labs:  Reviewed  Last done 1/26/2017  HIV viral load  detected, less than 20 copies/mL  Absolute CD4 717    Imaging:  No recent imaging studies      ASSESSMENT:    HIV follow-up after being lost to care for almost 2 years, and without labs for almost 3 years.      PLAN:    - Labs today for routine HIV monitoring and for STI screen    - Refilled Triumeq    - Asked patient to return in 1 month to discuss HIV health maintenance.  If he is able to engage in clinic, we can consider a switch to Biktarvy for the smaller pill size.  Due to patient's absence from clinic, I would like to see if he will return as requested.  After a switch, we do want to be able to repeat labs 1 month later, and I would like to see if he can maintain engagement before pursuing this switch.      Breezy Khoury MD, MS  Infectious Disease    Time:  I spent 30 minutes in direct care with this patient, including >50% of time face-to-face with the patient counseling about the importance of engagement in HIV care.

## 2019-10-26 LAB
CD3 CELLS # BLD: 1985 CELLS/UL (ref 603–2990)
CD3 CELLS NFR BLD: 81 % (ref 49–84)
CD3+CD4+ CELLS # BLD: 567 CELLS/UL (ref 441–2156)
CD3+CD4+ CELLS NFR BLD: 23 % (ref 28–63)
CD3+CD4+ CELLS/CD3+CD8+ CLL BLD: 0.42 % (ref 1.4–2.6)
CD3+CD8+ CELLS # BLD: 1347 CELLS/UL (ref 125–1312)
CD3+CD8+ CELLS NFR BLD: 55 % (ref 10–40)
HCV AB SERPL QL IA: NONREACTIVE
IFC SPECIMEN: ABNORMAL
RPR SER QL: NONREACTIVE

## 2019-10-27 LAB
C TRACH DNA SPEC QL NAA+PROBE: NEGATIVE
N GONORRHOEA DNA SPEC QL NAA+PROBE: NEGATIVE
SPECIMEN SOURCE: NORMAL

## 2019-10-29 LAB
HIV1 RNA # PLAS NAA DL=20: 25 {COPIES}/ML
HIV1 RNA SERPL NAA+PROBE-LOG#: 1.4 {LOG_COPIES}/ML

## 2019-11-05 ENCOUNTER — HEALTH MAINTENANCE LETTER (OUTPATIENT)
Age: 54
End: 2019-11-05

## 2019-12-05 ENCOUNTER — TELEPHONE (OUTPATIENT)
Dept: INFECTIOUS DISEASES | Facility: CLINIC | Age: 54
End: 2019-12-05

## 2019-12-06 ENCOUNTER — DOCUMENTATION ONLY (OUTPATIENT)
Dept: CARE COORDINATION | Facility: CLINIC | Age: 54
End: 2019-12-06

## 2020-03-27 ENCOUNTER — TELEPHONE (OUTPATIENT)
Dept: PHARMACY | Facility: CLINIC | Age: 55
End: 2020-03-27

## 2020-03-27 NOTE — TELEPHONE ENCOUNTER
ACE for pt to call me for a phone MTM/med ck visit.    Jodi Zapien, MTM Pharmacist.   [phone]

## 2020-11-22 ENCOUNTER — HEALTH MAINTENANCE LETTER (OUTPATIENT)
Age: 55
End: 2020-11-22

## 2021-09-19 ENCOUNTER — HEALTH MAINTENANCE LETTER (OUTPATIENT)
Age: 56
End: 2021-09-19

## 2021-11-16 NOTE — ED NOTES
"ED to Behavioral Floor Handoff    SITUATION  John Barclay is a 54 year old male who speaks English and lives is homeless unknown The patient arrived in the ED by ambulance from street with a complaint of Homeless (\"I'm homeless. I need food. I need place to stay. I don't trust those shelter.\"); Delusional (\"I am an exceptional person. My mind works extremely fast.\"); and Hallucinations (patient denies hallucination but has been talking to himself)  .The patient's current symptoms started/worsened- Patient said that he has been having difficulty sleeping for months or weeks. In the ED, pt was diagnosed with   Final diagnoses:   Bipolar affective disorder, currently manic, moderate (H)        Initial vitals were: BP: 137/89  Pulse: 98  Temp: 97.2  F (36.2  C)  Resp: 16  Height: 175.3 cm (5' 9\")  SpO2: 97 %   --------  Is the patient diabetic? No   If yes, last blood glucose? --     If yes, was this treated in the ED? --  --------  Is the patient inebriated (ETOH) No or Impaired on other substances? No  MSSA done? No  Last MSSA score: --    Were withdrawal symptoms treated? No  Does the patient have a seizure history? No. If yes, date of most recent seizure--  --------  Is the patient patient experiencing suicidal ideation? denies current or recent suicidal ideation     Homicidal ideation? denies current or recent homicidal ideation or behaviors.    Self-injurious behavior/urges? denies current or recent self injurious behavior or ideation.  ------  Was pt aggressive in the ED No. Pt was agitated when 72 hour Hold was served. He calmed down after talking to MD.  Was a code called No  Is the pt now cooperative? Yes  -------  Meds given in ED:   Medications   OLANZapine zydis (zyPREXA) ODT tab 10 mg (10 mg Oral Given 8/3/19 2042)      Family present during ED course? No  Family currently present? No    BACKGROUND  Does the patient have a cognitive impairment or developmental disability? No  Allergies:   Allergies "   Allergen Reactions     No Known Allergies    .   Social demographics are   Social History     Socioeconomic History     Marital status:      Spouse name: None     Number of children: None     Years of education: None     Highest education level: None   Occupational History     None   Social Needs     Financial resource strain: None     Food insecurity:     Worry: None     Inability: None     Transportation needs:     Medical: None     Non-medical: None   Tobacco Use     Smoking status: Current Every Day Smoker     Packs/day: 0.50     Types: Cigarettes     Last attempt to quit: 1994     Years since quittin.6     Smokeless tobacco: Never Used   Substance and Sexual Activity     Alcohol use: No     Alcohol/week: 0.0 oz     Drug use: Not Currently     Comment: has not use meth for 24 days     Sexual activity: Not Currently     Partners: Female     Comment: wife, Hannah, 3 children    Lifestyle     Physical activity:     Days per week: None     Minutes per session: None     Stress: None   Relationships     Social connections:     Talks on phone: None     Gets together: None     Attends Buddhism service: None     Active member of club or organization: None     Attends meetings of clubs or organizations: None     Relationship status: None     Intimate partner violence:     Fear of current or ex partner: None     Emotionally abused: None     Physically abused: None     Forced sexual activity: None   Other Topics Concern     Parent/sibling w/ CABG, MI or angioplasty before 65F 55M? Not Asked   Social History Narrative    , 3 children    Employed as a residential         ASSESSMENT  Labs results   Labs Ordered and Resulted from Time of ED Arrival Up to the Time of Departure from the ED   ALCOHOL BREATH TEST POCT - Normal   DRUG ABUSE SCREEN 6 CHEM DEP URINE ([facility])   CBC WITH PLATELETS DIFFERENTIAL   COMPREHENSIVE METABOLIC PANEL      Imaging Studies: No results found for this or any previous  "visit (from the past 24 hour(s)).   Most recent vital signs /89   Pulse 98   Temp 97.2  F (36.2  C) (Oral)   Resp 16   Ht 1.753 m (5' 9\")   SpO2 97%   BMI 27.62 kg/m     Abnormal labs/tests/findings requiring intervention:---   Pain control: pt had none  Nausea control: pt had none    RECOMMENDATION  Are any infection precautions needed (MRSA, VRE, etc.)? Yes If yes, what infection? --MRSA  ---  Does the patient have mobility issues? independently. If yes, what device does the pt use? ---  ---  Is patient on 72 hour hold or commitment? Yes If on 72 hour hold, have hold and rights been given to patient? Yes  Are admitting orders written if after 10 p.m. ?N/A  Tasks needing to be completed:---     Luis Alberto rodriguez--    [phone] [place] ED   [phone] [facility] ED  " 5 No

## 2022-01-09 ENCOUNTER — HEALTH MAINTENANCE LETTER (OUTPATIENT)
Age: 57
End: 2022-01-09

## 2022-11-20 ENCOUNTER — HEALTH MAINTENANCE LETTER (OUTPATIENT)
Age: 57
End: 2022-11-20

## 2023-04-15 ENCOUNTER — HEALTH MAINTENANCE LETTER (OUTPATIENT)
Age: 58
End: 2023-04-15

## 2024-06-22 ENCOUNTER — HEALTH MAINTENANCE LETTER (OUTPATIENT)
Age: 59
End: 2024-06-22

## 2025-08-28 ENCOUNTER — MYC MEDICAL ADVICE (OUTPATIENT)
Dept: INFECTIOUS DISEASES | Facility: CLINIC | Age: 60
End: 2025-08-28

## 2025-08-28 ENCOUNTER — TELEPHONE (OUTPATIENT)
Dept: INFECTIOUS DISEASES | Facility: CLINIC | Age: 60
End: 2025-08-28

## (undated) RX ORDER — LIDOCAINE HYDROCHLORIDE 10 MG/ML
INJECTION, SOLUTION EPIDURAL; INFILTRATION; INTRACAUDAL; PERINEURAL
Status: DISPENSED
Start: 2017-02-22

## (undated) RX ORDER — CEFTRIAXONE SODIUM 250 MG/1
INJECTION, POWDER, FOR SOLUTION INTRAMUSCULAR; INTRAVENOUS
Status: DISPENSED
Start: 2017-02-22